# Patient Record
Sex: FEMALE | Race: WHITE | Employment: UNEMPLOYED | URBAN - NONMETROPOLITAN AREA
[De-identification: names, ages, dates, MRNs, and addresses within clinical notes are randomized per-mention and may not be internally consistent; named-entity substitution may affect disease eponyms.]

---

## 2017-02-15 ENCOUNTER — TELEPHONE (OUTPATIENT)
Dept: INTERNAL MEDICINE | Age: 22
End: 2017-02-15

## 2017-02-15 ENCOUNTER — OFFICE VISIT (OUTPATIENT)
Dept: INTERNAL MEDICINE | Age: 22
End: 2017-02-15

## 2017-02-15 VITALS
HEIGHT: 60 IN | SYSTOLIC BLOOD PRESSURE: 106 MMHG | DIASTOLIC BLOOD PRESSURE: 80 MMHG | HEART RATE: 104 BPM | WEIGHT: 89 LBS | BODY MASS INDEX: 17.47 KG/M2

## 2017-02-15 DIAGNOSIS — M54.9 CHRONIC MIDLINE BACK PAIN, UNSPECIFIED BACK LOCATION: ICD-10-CM

## 2017-02-15 DIAGNOSIS — H20.9 IRIDOCYCLITIS OF RIGHT EYE: ICD-10-CM

## 2017-02-15 DIAGNOSIS — Z15.89 HLA B27 (HLA B27 POSITIVE): ICD-10-CM

## 2017-02-15 DIAGNOSIS — G89.29 CHRONIC MIDLINE BACK PAIN, UNSPECIFIED BACK LOCATION: ICD-10-CM

## 2017-02-15 PROCEDURE — 99214 OFFICE O/P EST MOD 30 MIN: CPT | Performed by: INTERNAL MEDICINE

## 2017-03-16 ENCOUNTER — OFFICE VISIT (OUTPATIENT)
Dept: INTERNAL MEDICINE | Age: 22
End: 2017-03-16

## 2017-03-16 VITALS
DIASTOLIC BLOOD PRESSURE: 80 MMHG | HEART RATE: 112 BPM | WEIGHT: 88 LBS | SYSTOLIC BLOOD PRESSURE: 110 MMHG | BODY MASS INDEX: 17.28 KG/M2 | HEIGHT: 60 IN

## 2017-03-16 DIAGNOSIS — E10.9 TYPE 1 DIABETES MELLITUS WITHOUT COMPLICATION (HCC): Primary | ICD-10-CM

## 2017-03-16 PROCEDURE — 99213 OFFICE O/P EST LOW 20 MIN: CPT | Performed by: INTERNAL MEDICINE

## 2017-04-24 ENCOUNTER — OFFICE VISIT (OUTPATIENT)
Dept: INTERNAL MEDICINE | Age: 22
End: 2017-04-24

## 2017-04-24 ENCOUNTER — TELEPHONE (OUTPATIENT)
Dept: INTERNAL MEDICINE | Age: 22
End: 2017-04-24

## 2017-04-24 VITALS
DIASTOLIC BLOOD PRESSURE: 86 MMHG | WEIGHT: 80 LBS | HEIGHT: 60 IN | HEART RATE: 88 BPM | BODY MASS INDEX: 15.71 KG/M2 | SYSTOLIC BLOOD PRESSURE: 120 MMHG

## 2017-04-24 DIAGNOSIS — E10.9 TYPE 1 DIABETES MELLITUS WITHOUT COMPLICATION (HCC): Primary | ICD-10-CM

## 2017-04-24 LAB — HBA1C MFR BLD: 13.5 %

## 2017-04-24 PROCEDURE — 83036 HEMOGLOBIN GLYCOSYLATED A1C: CPT | Performed by: INTERNAL MEDICINE

## 2017-04-24 PROCEDURE — 36416 COLLJ CAPILLARY BLOOD SPEC: CPT | Performed by: INTERNAL MEDICINE

## 2017-04-24 PROCEDURE — 99213 OFFICE O/P EST LOW 20 MIN: CPT | Performed by: INTERNAL MEDICINE

## 2017-04-24 RX ORDER — MELOXICAM 7.5 MG/1
7.5 TABLET ORAL DAILY
Status: ON HOLD | COMMUNITY
Start: 2017-04-14 | End: 2017-08-02

## 2017-04-24 RX ORDER — CYCLOBENZAPRINE HCL 5 MG
0.25 TABLET ORAL PRN
Status: ON HOLD | COMMUNITY
Start: 2017-04-11 | End: 2017-12-22 | Stop reason: HOSPADM

## 2017-04-25 ENCOUNTER — TELEPHONE (OUTPATIENT)
Dept: INTERNAL MEDICINE | Age: 22
End: 2017-04-25

## 2017-04-25 RX ORDER — RAMIPRIL 5 MG/1
5 CAPSULE ORAL DAILY
Qty: 30 CAPSULE | Refills: 3 | Status: ON HOLD | OUTPATIENT
Start: 2017-04-25 | End: 2017-08-02

## 2017-07-21 ENCOUNTER — APPOINTMENT (OUTPATIENT)
Dept: GENERAL RADIOLOGY | Age: 22
End: 2017-07-21
Payer: COMMERCIAL

## 2017-07-21 ENCOUNTER — HOSPITAL ENCOUNTER (EMERGENCY)
Age: 22
Discharge: HOME | End: 2017-07-21
Attending: FAMILY MEDICINE
Payer: COMMERCIAL

## 2017-07-21 VITALS
TEMPERATURE: 98 F | WEIGHT: 83 LBS | SYSTOLIC BLOOD PRESSURE: 115 MMHG | OXYGEN SATURATION: 93 % | RESPIRATION RATE: 16 BRPM | HEART RATE: 77 BPM | DIASTOLIC BLOOD PRESSURE: 74 MMHG | BODY MASS INDEX: 16.21 KG/M2

## 2017-07-21 DIAGNOSIS — D16.02 OSTEOCHONDROMA OF LEFT HUMERUS: Primary | ICD-10-CM

## 2017-07-21 PROCEDURE — 73030 X-RAY EXAM OF SHOULDER: CPT

## 2017-07-21 PROCEDURE — 99283 EMERGENCY DEPT VISIT LOW MDM: CPT

## 2017-07-21 ASSESSMENT — ENCOUNTER SYMPTOMS
COUGH: 0
VOICE CHANGE: 0
SHORTNESS OF BREATH: 0
DIARRHEA: 0
FACIAL SWELLING: 0
VOMITING: 0
ABDOMINAL PAIN: 0
WHEEZING: 0
CONSTIPATION: 0
ABDOMINAL DISTENTION: 0
BACK PAIN: 0
PHOTOPHOBIA: 0
EYE PAIN: 0
RHINORRHEA: 0
NAUSEA: 0
SORE THROAT: 0
CHEST TIGHTNESS: 0
STRIDOR: 0
EYE REDNESS: 0
EYE DISCHARGE: 0
COLOR CHANGE: 0

## 2017-07-21 ASSESSMENT — PAIN DESCRIPTION - DESCRIPTORS: DESCRIPTORS: DULL

## 2017-07-21 ASSESSMENT — PAIN DESCRIPTION - ORIENTATION: ORIENTATION: LEFT

## 2017-07-21 ASSESSMENT — PAIN DESCRIPTION - LOCATION: LOCATION: SHOULDER

## 2017-07-21 ASSESSMENT — PAIN DESCRIPTION - PAIN TYPE: TYPE: ACUTE PAIN

## 2017-07-21 ASSESSMENT — PAIN SCALES - GENERAL: PAINLEVEL_OUTOF10: 2

## 2017-08-01 ENCOUNTER — APPOINTMENT (OUTPATIENT)
Dept: INTERVENTIONAL RADIOLOGY/VASCULAR | Age: 22
DRG: 639 | End: 2017-08-01
Payer: COMMERCIAL

## 2017-08-01 ENCOUNTER — HOSPITAL ENCOUNTER (INPATIENT)
Age: 22
LOS: 4 days | Discharge: HOME | DRG: 639 | End: 2017-08-05
Attending: FAMILY MEDICINE | Admitting: INTERNAL MEDICINE
Payer: COMMERCIAL

## 2017-08-01 DIAGNOSIS — E10.10 DIABETIC KETOACIDOSIS WITHOUT COMA ASSOCIATED WITH TYPE 1 DIABETES MELLITUS (HCC): Primary | ICD-10-CM

## 2017-08-01 DIAGNOSIS — Z87.898 HISTORY OF DIARRHEA: ICD-10-CM

## 2017-08-01 DIAGNOSIS — R60.0 PEDAL EDEMA: ICD-10-CM

## 2017-08-01 DIAGNOSIS — E10.9 TYPE 1 DIABETES MELLITUS WITHOUT COMPLICATION (HCC): ICD-10-CM

## 2017-08-01 DIAGNOSIS — Z91.14 H/O MEDICATION NONCOMPLIANCE: ICD-10-CM

## 2017-08-01 PROBLEM — E11.10 DKA (DIABETIC KETOACIDOSES): Status: ACTIVE | Noted: 2017-08-01

## 2017-08-01 PROBLEM — R19.7 DIARRHEA: Status: ACTIVE | Noted: 2017-08-01

## 2017-08-01 LAB
ALBUMIN SERPL-MCNC: 4.2 G/DL (ref 3.5–5.1)
ALP BLD-CCNC: 173 U/L (ref 38–126)
ALT SERPL-CCNC: 24 U/L (ref 11–66)
ANION GAP SERPL CALCULATED.3IONS-SCNC: 15 MEQ/L (ref 8–16)
ANION GAP SERPL CALCULATED.3IONS-SCNC: 33 MEQ/L (ref 8–16)
AST SERPL-CCNC: 24 U/L (ref 5–40)
AVERAGE GLUCOSE: 525 MG/DL (ref 70–126)
BACTERIA: ABNORMAL /HPF
BASOPHILS # BLD: 0.1 %
BASOPHILS ABSOLUTE: 0 THOU/MM3 (ref 0–0.1)
BETA-HYDROXYBUTYRATE: 13.79 MG/DL (ref 0.2–2.81)
BETA-HYDROXYBUTYRATE: 70.87 MG/DL (ref 0.2–2.81)
BILIRUB SERPL-MCNC: 0.3 MG/DL (ref 0.3–1.2)
BILIRUBIN DIRECT: < 0.2 MG/DL (ref 0–0.3)
BILIRUBIN URINE: NEGATIVE
BLOOD, URINE: NEGATIVE
BUN BLDV-MCNC: 16 MG/DL (ref 7–22)
BUN BLDV-MCNC: 21 MG/DL (ref 7–22)
CALCIUM SERPL-MCNC: 8.1 MG/DL (ref 8.5–10.5)
CALCIUM SERPL-MCNC: 9.4 MG/DL (ref 8.5–10.5)
CASTS 2: ABNORMAL /LPF
CASTS UA: ABNORMAL /LPF
CHARACTER, URINE: CLEAR
CHLORIDE BLD-SCNC: 83 MEQ/L (ref 98–111)
CHLORIDE BLD-SCNC: 97 MEQ/L (ref 98–111)
CHP ED QC CHECK: NORMAL
CLOSTRIDIUM DIFFICILE, PCR: NEGATIVE
CO2: 14 MEQ/L (ref 23–33)
CO2: 21 MEQ/L (ref 23–33)
COLOR: YELLOW
CREAT SERPL-MCNC: 0.5 MG/DL (ref 0.4–1.2)
CREAT SERPL-MCNC: 0.6 MG/DL (ref 0.4–1.2)
CRYSTALS, UA: ABNORMAL
D-DIMER QUANTITATIVE: < 215 NG/ML FEU (ref 0–500)
EOSINOPHIL # BLD: 0.4 %
EOSINOPHILS ABSOLUTE: 0 THOU/MM3 (ref 0–0.4)
EPITHELIAL CELLS, UA: ABNORMAL /HPF
GFR SERPL CREATININE-BSD FRML MDRD: > 90 ML/MIN/1.73M2
GFR SERPL CREATININE-BSD FRML MDRD: > 90 ML/MIN/1.73M2
GLUCOSE BLD-MCNC: 192 MG/DL (ref 70–108)
GLUCOSE BLD-MCNC: 271 MG/DL (ref 70–108)
GLUCOSE BLD-MCNC: 308 MG/DL (ref 70–108)
GLUCOSE BLD-MCNC: 371 MG/DL (ref 70–108)
GLUCOSE BLD-MCNC: 381 MG/DL (ref 70–108)
GLUCOSE BLD-MCNC: 412 MG/DL (ref 70–108)
GLUCOSE BLD-MCNC: 424 MG/DL (ref 70–108)
GLUCOSE BLD-MCNC: 445 MG/DL (ref 70–108)
GLUCOSE BLD-MCNC: 481 MG/DL
GLUCOSE BLD-MCNC: 481 MG/DL (ref 70–108)
GLUCOSE BLD-MCNC: 536 MG/DL (ref 70–108)
GLUCOSE URINE: >= 1000 MG/DL
HBA1C MFR BLD: 19.5 % (ref 4.4–6.4)
HBA1C MFR BLD: NORMAL % (ref 4.4–6.4)
HCT VFR BLD CALC: 36.6 % (ref 37–47)
HEMOGLOBIN: 12.9 GM/DL (ref 12–16)
KETONES, URINE: >= 160
LACTIC ACID: 0.7 MMOL/L (ref 0.5–2.2)
LEUKOCYTE ESTERASE, URINE: NEGATIVE
LIPASE: 39.8 U/L (ref 5.6–51.3)
LYMPHOCYTES # BLD: 21.8 %
LYMPHOCYTES ABSOLUTE: 1.5 THOU/MM3 (ref 1–4.8)
MAGNESIUM: 1.9 MG/DL (ref 1.6–2.4)
MAGNESIUM: 2.3 MG/DL (ref 1.6–2.4)
MCH RBC QN AUTO: 33.8 PG (ref 27–31)
MCHC RBC AUTO-ENTMCNC: 35.1 GM/DL (ref 33–37)
MCV RBC AUTO: 96.5 FL (ref 81–99)
MISCELLANEOUS 2: ABNORMAL
MONOCYTES # BLD: 9.4 %
MONOCYTES ABSOLUTE: 0.7 THOU/MM3 (ref 0.4–1.3)
NITRITE, URINE: NEGATIVE
NUCLEATED RED BLOOD CELLS: 0 /100 WBC
OSMOLALITY CALCULATION: 288.1 MOSMOL/KG (ref 275–300)
PDW BLD-RTO: 14.1 % (ref 11.5–14.5)
PH UA: 5
PH VENOUS: 7.33 (ref 7.33–7.43)
PHOSPHORUS: 3.5 MG/DL (ref 2.4–4.7)
PHOSPHORUS: 4.5 MG/DL (ref 2.4–4.7)
PLATELET # BLD: 346 THOU/MM3 (ref 130–400)
PMV BLD AUTO: 7.3 MCM (ref 7.4–10.4)
POTASSIUM SERPL-SCNC: 3.8 MEQ/L (ref 3.5–5.2)
POTASSIUM SERPL-SCNC: 4.8 MEQ/L (ref 3.5–5.2)
PREALBUMIN: 16.8 MG/DL (ref 20–40)
PREGNANCY, URINE: NEGATIVE
PRO-BNP: 14.9 PG/ML (ref 0–450)
PROTEIN UA: NEGATIVE
RBC # BLD: 3.8 MILL/MM3 (ref 4.2–5.4)
RBC # BLD: NORMAL 10*6/UL
RBC URINE: ABNORMAL /HPF
RENAL EPITHELIAL, UA: ABNORMAL
SEG NEUTROPHILS: 68.3 %
SEGMENTED NEUTROPHILS ABSOLUTE COUNT: 4.8 THOU/MM3 (ref 1.8–7.7)
SODIUM BLD-SCNC: 130 MEQ/L (ref 135–145)
SODIUM BLD-SCNC: 133 MEQ/L (ref 135–145)
SPECIFIC GRAVITY, URINE: > 1.03 (ref 1–1.03)
TOTAL CK: 35 U/L (ref 30–135)
TOTAL PROTEIN: 6.8 G/DL (ref 6.1–8)
TSH SERPL DL<=0.05 MIU/L-ACNC: 3.28 UIU/ML (ref 0.4–4.2)
UROBILINOGEN, URINE: 0.2 EU/DL
WBC # BLD: 7 THOU/MM3 (ref 4.8–10.8)
WBC UA: ABNORMAL /HPF
YEAST: ABNORMAL

## 2017-08-01 PROCEDURE — 81001 URINALYSIS AUTO W/SCOPE: CPT

## 2017-08-01 PROCEDURE — 2580000003 HC RX 258: Performed by: FAMILY MEDICINE

## 2017-08-01 PROCEDURE — 2580000003 HC RX 258: Performed by: INTERNAL MEDICINE

## 2017-08-01 PROCEDURE — 83036 HEMOGLOBIN GLYCOSYLATED A1C: CPT

## 2017-08-01 PROCEDURE — 1200000003 HC TELEMETRY R&B

## 2017-08-01 PROCEDURE — 84134 ASSAY OF PREALBUMIN: CPT

## 2017-08-01 PROCEDURE — 85025 COMPLETE CBC W/AUTO DIFF WBC: CPT

## 2017-08-01 PROCEDURE — 82800 BLOOD PH: CPT

## 2017-08-01 PROCEDURE — 80048 BASIC METABOLIC PNL TOTAL CA: CPT

## 2017-08-01 PROCEDURE — 82550 ASSAY OF CK (CPK): CPT

## 2017-08-01 PROCEDURE — 6370000000 HC RX 637 (ALT 250 FOR IP): Performed by: FAMILY MEDICINE

## 2017-08-01 PROCEDURE — 87040 BLOOD CULTURE FOR BACTERIA: CPT

## 2017-08-01 PROCEDURE — 87427 SHIGA-LIKE TOXIN AG IA: CPT

## 2017-08-01 PROCEDURE — 84100 ASSAY OF PHOSPHORUS: CPT

## 2017-08-01 PROCEDURE — 80053 COMPREHEN METABOLIC PANEL: CPT

## 2017-08-01 PROCEDURE — 83880 ASSAY OF NATRIURETIC PEPTIDE: CPT

## 2017-08-01 PROCEDURE — 93970 EXTREMITY STUDY: CPT

## 2017-08-01 PROCEDURE — 96361 HYDRATE IV INFUSION ADD-ON: CPT

## 2017-08-01 PROCEDURE — 87329 GIARDIA AG IA: CPT

## 2017-08-01 PROCEDURE — 99284 EMERGENCY DEPT VISIT MOD MDM: CPT

## 2017-08-01 PROCEDURE — 2500000003 HC RX 250 WO HCPCS: Performed by: INTERNAL MEDICINE

## 2017-08-01 PROCEDURE — 83735 ASSAY OF MAGNESIUM: CPT

## 2017-08-01 PROCEDURE — 87086 URINE CULTURE/COLONY COUNT: CPT

## 2017-08-01 PROCEDURE — 85379 FIBRIN DEGRADATION QUANT: CPT

## 2017-08-01 PROCEDURE — 83605 ASSAY OF LACTIC ACID: CPT

## 2017-08-01 PROCEDURE — 83690 ASSAY OF LIPASE: CPT

## 2017-08-01 PROCEDURE — 36415 COLL VENOUS BLD VENIPUNCTURE: CPT

## 2017-08-01 PROCEDURE — 82948 REAGENT STRIP/BLOOD GLUCOSE: CPT

## 2017-08-01 PROCEDURE — 96365 THER/PROPH/DIAG IV INF INIT: CPT

## 2017-08-01 PROCEDURE — 81025 URINE PREGNANCY TEST: CPT

## 2017-08-01 PROCEDURE — 96366 THER/PROPH/DIAG IV INF ADDON: CPT

## 2017-08-01 PROCEDURE — 87045 FECES CULTURE AEROBIC BACT: CPT

## 2017-08-01 PROCEDURE — 82010 KETONE BODYS QUAN: CPT

## 2017-08-01 PROCEDURE — 87493 C DIFF AMPLIFIED PROBE: CPT

## 2017-08-01 PROCEDURE — 82248 BILIRUBIN DIRECT: CPT

## 2017-08-01 PROCEDURE — 82043 UR ALBUMIN QUANTITATIVE: CPT

## 2017-08-01 PROCEDURE — 84443 ASSAY THYROID STIM HORMONE: CPT

## 2017-08-01 RX ORDER — 0.9 % SODIUM CHLORIDE 0.9 %
1000 INTRAVENOUS SOLUTION INTRAVENOUS ONCE
Status: COMPLETED | OUTPATIENT
Start: 2017-08-01 | End: 2017-08-01

## 2017-08-01 RX ORDER — DEXTROSE AND SODIUM CHLORIDE 5; .45 G/100ML; G/100ML
INJECTION, SOLUTION INTRAVENOUS CONTINUOUS PRN
Status: DISCONTINUED | OUTPATIENT
Start: 2017-08-01 | End: 2017-08-02

## 2017-08-01 RX ORDER — POTASSIUM CHLORIDE 7.45 MG/ML
10 INJECTION INTRAVENOUS PRN
Status: DISCONTINUED | OUTPATIENT
Start: 2017-08-01 | End: 2017-08-05 | Stop reason: HOSPADM

## 2017-08-01 RX ORDER — DEXTROSE MONOHYDRATE 25 G/50ML
12.5 INJECTION, SOLUTION INTRAVENOUS PRN
Status: DISCONTINUED | OUTPATIENT
Start: 2017-08-01 | End: 2017-08-02 | Stop reason: SDUPTHER

## 2017-08-01 RX ORDER — SODIUM CHLORIDE 450 MG/100ML
INJECTION, SOLUTION INTRAVENOUS CONTINUOUS
Status: DISCONTINUED | OUTPATIENT
Start: 2017-08-01 | End: 2017-08-01 | Stop reason: ALTCHOICE

## 2017-08-01 RX ORDER — RAMIPRIL 5 MG/1
5 CAPSULE ORAL DAILY
Status: CANCELLED | OUTPATIENT
Start: 2017-08-01

## 2017-08-01 RX ADMIN — SODIUM BICARBONATE: 84 INJECTION, SOLUTION INTRAVENOUS at 21:39

## 2017-08-01 RX ADMIN — SODIUM CHLORIDE 1000 ML: 9 INJECTION, SOLUTION INTRAVENOUS at 15:01

## 2017-08-01 RX ADMIN — SODIUM CHLORIDE 1000 ML: 9 INJECTION, SOLUTION INTRAVENOUS at 16:34

## 2017-08-01 RX ADMIN — SODIUM CHLORIDE 7.9 UNITS/KG/HR: 9 INJECTION, SOLUTION INTRAVENOUS at 23:40

## 2017-08-01 RX ADMIN — SODIUM CHLORIDE 7 UNITS/HR: 9 INJECTION, SOLUTION INTRAVENOUS at 16:31

## 2017-08-01 ASSESSMENT — PAIN SCALES - GENERAL: PAINLEVEL_OUTOF10: 0

## 2017-08-02 LAB
ANION GAP SERPL CALCULATED.3IONS-SCNC: 10 MEQ/L (ref 8–16)
ANION GAP SERPL CALCULATED.3IONS-SCNC: 11 MEQ/L (ref 8–16)
ANION GAP SERPL CALCULATED.3IONS-SCNC: 12 MEQ/L (ref 8–16)
ANION GAP SERPL CALCULATED.3IONS-SCNC: 14 MEQ/L (ref 8–16)
BASOPHILS # BLD: 0.3 %
BASOPHILS ABSOLUTE: 0 THOU/MM3 (ref 0–0.1)
BETA-HYDROXYBUTYRATE: 0.76 MG/DL (ref 0.2–2.81)
BETA-HYDROXYBUTYRATE: 8.62 MG/DL (ref 0.2–2.81)
BUN BLDV-MCNC: 10 MG/DL (ref 7–22)
BUN BLDV-MCNC: 13 MG/DL (ref 7–22)
BUN BLDV-MCNC: 15 MG/DL (ref 7–22)
BUN BLDV-MCNC: 15 MG/DL (ref 7–22)
BUN BLDV-MCNC: 16 MG/DL (ref 7–22)
BUN BLDV-MCNC: 9 MG/DL (ref 7–22)
CALCIUM SERPL-MCNC: 8.1 MG/DL (ref 8.5–10.5)
CALCIUM SERPL-MCNC: 8.2 MG/DL (ref 8.5–10.5)
CALCIUM SERPL-MCNC: 8.3 MG/DL (ref 8.5–10.5)
CALCIUM SERPL-MCNC: 8.5 MG/DL (ref 8.5–10.5)
CHLORIDE BLD-SCNC: 100 MEQ/L (ref 98–111)
CHLORIDE BLD-SCNC: 95 MEQ/L (ref 98–111)
CHLORIDE BLD-SCNC: 96 MEQ/L (ref 98–111)
CHLORIDE BLD-SCNC: 96 MEQ/L (ref 98–111)
CHLORIDE BLD-SCNC: 97 MEQ/L (ref 98–111)
CHLORIDE BLD-SCNC: 97 MEQ/L (ref 98–111)
CLOSTRIDIUM DIFFICILE, PCR: NEGATIVE
CO2: 24 MEQ/L (ref 23–33)
CO2: 27 MEQ/L (ref 23–33)
CO2: 30 MEQ/L (ref 23–33)
CO2: 30 MEQ/L (ref 23–33)
CREAT SERPL-MCNC: 0.3 MG/DL (ref 0.4–1.2)
CREAT SERPL-MCNC: 0.4 MG/DL (ref 0.4–1.2)
CREATININE, URINE: 20.9 MG/DL
EOSINOPHIL # BLD: 0.6 %
EOSINOPHILS ABSOLUTE: 0 THOU/MM3 (ref 0–0.4)
FECAL LEUKOCYTES: NORMAL
GFR SERPL CREATININE-BSD FRML MDRD: > 90 ML/MIN/1.73M2
GIARDIA ANTIGEN STOOL: NEGATIVE
GLUCOSE BLD-MCNC: 117 MG/DL (ref 70–108)
GLUCOSE BLD-MCNC: 118 MG/DL (ref 70–108)
GLUCOSE BLD-MCNC: 119 MG/DL (ref 70–108)
GLUCOSE BLD-MCNC: 127 MG/DL (ref 70–108)
GLUCOSE BLD-MCNC: 136 MG/DL (ref 70–108)
GLUCOSE BLD-MCNC: 144 MG/DL (ref 70–108)
GLUCOSE BLD-MCNC: 145 MG/DL (ref 70–108)
GLUCOSE BLD-MCNC: 146 MG/DL (ref 70–108)
GLUCOSE BLD-MCNC: 147 MG/DL (ref 70–108)
GLUCOSE BLD-MCNC: 147 MG/DL (ref 70–108)
GLUCOSE BLD-MCNC: 151 MG/DL (ref 70–108)
GLUCOSE BLD-MCNC: 151 MG/DL (ref 70–108)
GLUCOSE BLD-MCNC: 153 MG/DL (ref 70–108)
GLUCOSE BLD-MCNC: 154 MG/DL (ref 70–108)
GLUCOSE BLD-MCNC: 157 MG/DL (ref 70–108)
GLUCOSE BLD-MCNC: 184 MG/DL (ref 70–108)
GLUCOSE BLD-MCNC: 185 MG/DL (ref 70–108)
GLUCOSE BLD-MCNC: 196 MG/DL (ref 70–108)
GLUCOSE BLD-MCNC: 199 MG/DL (ref 70–108)
GLUCOSE BLD-MCNC: 231 MG/DL (ref 70–108)
GLUCOSE BLD-MCNC: 238 MG/DL (ref 70–108)
GLUCOSE BLD-MCNC: 275 MG/DL (ref 70–108)
GLUCOSE BLD-MCNC: 290 MG/DL (ref 70–108)
GLUCOSE BLD-MCNC: 327 MG/DL (ref 70–108)
GLUCOSE BLD-MCNC: 336 MG/DL (ref 70–108)
GLUCOSE BLD-MCNC: 343 MG/DL (ref 70–108)
GLUCOSE BLD-MCNC: 343 MG/DL (ref 70–108)
HCT VFR BLD CALC: 28.6 % (ref 37–47)
HEMOGLOBIN: 10.1 GM/DL (ref 12–16)
LYMPHOCYTES # BLD: 26.9 %
LYMPHOCYTES ABSOLUTE: 1.4 THOU/MM3 (ref 1–4.8)
MAGNESIUM: 1.7 MG/DL (ref 1.6–2.4)
MAGNESIUM: 1.8 MG/DL (ref 1.6–2.4)
MCH RBC QN AUTO: 33.9 PG (ref 27–31)
MCHC RBC AUTO-ENTMCNC: 35.3 GM/DL (ref 33–37)
MCV RBC AUTO: 95.9 FL (ref 81–99)
MICROALBUMIN UR-MCNC: 1.36 MG/DL
MICROALBUMIN/CREAT UR-RTO: 65 MG/G (ref 0–30)
MONOCYTES # BLD: 11.2 %
MONOCYTES ABSOLUTE: 0.6 THOU/MM3 (ref 0.4–1.3)
NUCLEATED RED BLOOD CELLS: 0 /100 WBC
ORGANISM: ABNORMAL
PDW BLD-RTO: 14 % (ref 11.5–14.5)
PH VENOUS: 7.41 (ref 7.33–7.43)
PH VENOUS: 7.43 (ref 7.33–7.43)
PH VENOUS: 7.45 (ref 7.33–7.43)
PH VENOUS: 7.45 (ref 7.33–7.43)
PH VENOUS: 7.48 (ref 7.33–7.43)
PHOSPHORUS: 3 MG/DL (ref 2.4–4.7)
PHOSPHORUS: 3.1 MG/DL (ref 2.4–4.7)
PHOSPHORUS: 3.2 MG/DL (ref 2.4–4.7)
PHOSPHORUS: 3.2 MG/DL (ref 2.4–4.7)
PLATELET # BLD: 248 THOU/MM3 (ref 130–400)
PMV BLD AUTO: 6.6 MCM (ref 7.4–10.4)
POTASSIUM SERPL-SCNC: 2.6 MEQ/L (ref 3.5–5.2)
POTASSIUM SERPL-SCNC: 2.8 MEQ/L (ref 3.5–5.2)
POTASSIUM SERPL-SCNC: 3 MEQ/L (ref 3.5–5.2)
POTASSIUM SERPL-SCNC: 3.1 MEQ/L (ref 3.5–5.2)
POTASSIUM SERPL-SCNC: 3.2 MEQ/L (ref 3.5–5.2)
POTASSIUM SERPL-SCNC: 3.5 MEQ/L (ref 3.5–5.2)
RBC # BLD: 2.98 MILL/MM3 (ref 4.2–5.4)
RBC # BLD: NORMAL 10*6/UL
ROTAVIRUS ANTIGEN: NEGATIVE
SEG NEUTROPHILS: 61 %
SEGMENTED NEUTROPHILS ABSOLUTE COUNT: 3.2 THOU/MM3 (ref 1.8–7.7)
SODIUM BLD-SCNC: 135 MEQ/L (ref 135–145)
SODIUM BLD-SCNC: 135 MEQ/L (ref 135–145)
SODIUM BLD-SCNC: 136 MEQ/L (ref 135–145)
SODIUM BLD-SCNC: 136 MEQ/L (ref 135–145)
SODIUM BLD-SCNC: 137 MEQ/L (ref 135–145)
SODIUM BLD-SCNC: 138 MEQ/L (ref 135–145)
URINE CULTURE REFLEX: ABNORMAL
WBC # BLD: 5.2 THOU/MM3 (ref 4.8–10.8)

## 2017-08-02 PROCEDURE — 2580000003 HC RX 258: Performed by: INTERNAL MEDICINE

## 2017-08-02 PROCEDURE — 6360000002 HC RX W HCPCS: Performed by: INTERNAL MEDICINE

## 2017-08-02 PROCEDURE — 80048 BASIC METABOLIC PNL TOTAL CA: CPT

## 2017-08-02 PROCEDURE — 36415 COLL VENOUS BLD VENIPUNCTURE: CPT

## 2017-08-02 PROCEDURE — 87427 SHIGA-LIKE TOXIN AG IA: CPT

## 2017-08-02 PROCEDURE — 2500000003 HC RX 250 WO HCPCS: Performed by: INTERNAL MEDICINE

## 2017-08-02 PROCEDURE — 1200000003 HC TELEMETRY R&B

## 2017-08-02 PROCEDURE — 89055 LEUKOCYTE ASSESSMENT FECAL: CPT

## 2017-08-02 PROCEDURE — 6370000000 HC RX 637 (ALT 250 FOR IP): Performed by: INTERNAL MEDICINE

## 2017-08-02 PROCEDURE — 2580000003 HC RX 258: Performed by: FAMILY MEDICINE

## 2017-08-02 PROCEDURE — 82800 BLOOD PH: CPT

## 2017-08-02 PROCEDURE — 82010 KETONE BODYS QUAN: CPT

## 2017-08-02 PROCEDURE — 87045 FECES CULTURE AEROBIC BACT: CPT

## 2017-08-02 PROCEDURE — 87425 ROTAVIRUS AG IA: CPT

## 2017-08-02 PROCEDURE — 84100 ASSAY OF PHOSPHORUS: CPT

## 2017-08-02 PROCEDURE — 6370000000 HC RX 637 (ALT 250 FOR IP): Performed by: FAMILY MEDICINE

## 2017-08-02 PROCEDURE — 82948 REAGENT STRIP/BLOOD GLUCOSE: CPT

## 2017-08-02 PROCEDURE — 83735 ASSAY OF MAGNESIUM: CPT

## 2017-08-02 PROCEDURE — 85025 COMPLETE CBC W/AUTO DIFF WBC: CPT

## 2017-08-02 PROCEDURE — 87493 C DIFF AMPLIFIED PROBE: CPT

## 2017-08-02 RX ORDER — POTASSIUM CHLORIDE 7.45 MG/ML
10 INJECTION INTRAVENOUS
Status: COMPLETED | OUTPATIENT
Start: 2017-08-02 | End: 2017-08-02

## 2017-08-02 RX ORDER — DEXTROSE AND SODIUM CHLORIDE 5; .45 G/100ML; G/100ML
INJECTION, SOLUTION INTRAVENOUS CONTINUOUS
Status: DISCONTINUED | OUTPATIENT
Start: 2017-08-02 | End: 2017-08-03

## 2017-08-02 RX ORDER — DEXTROSE MONOHYDRATE 25 G/50ML
12.5 INJECTION, SOLUTION INTRAVENOUS PRN
Status: DISCONTINUED | OUTPATIENT
Start: 2017-08-02 | End: 2017-08-05 | Stop reason: HOSPADM

## 2017-08-02 RX ORDER — NICOTINE POLACRILEX 4 MG
15 LOZENGE BUCCAL PRN
Status: DISCONTINUED | OUTPATIENT
Start: 2017-08-02 | End: 2017-08-05 | Stop reason: HOSPADM

## 2017-08-02 RX ORDER — ONDANSETRON 2 MG/ML
4 INJECTION INTRAMUSCULAR; INTRAVENOUS EVERY 6 HOURS PRN
Status: DISCONTINUED | OUTPATIENT
Start: 2017-08-02 | End: 2017-08-05 | Stop reason: HOSPADM

## 2017-08-02 RX ORDER — BUTALBITAL, ACETAMINOPHEN AND CAFFEINE 50; 325; 40 MG/1; MG/1; MG/1
1 TABLET ORAL EVERY 6 HOURS PRN
Status: DISCONTINUED | OUTPATIENT
Start: 2017-08-02 | End: 2017-08-05 | Stop reason: HOSPADM

## 2017-08-02 RX ORDER — M-VIT,TX,IRON,MINS/CALC/FOLIC 27MG-0.4MG
1 TABLET ORAL DAILY
COMMUNITY

## 2017-08-02 RX ORDER — LOPERAMIDE HYDROCHLORIDE 2 MG/1
2 CAPSULE ORAL 4 TIMES DAILY PRN
Status: DISCONTINUED | OUTPATIENT
Start: 2017-08-02 | End: 2017-08-05 | Stop reason: HOSPADM

## 2017-08-02 RX ADMIN — SODIUM CHLORIDE 0.23 UNITS/KG/HR: 9 INJECTION, SOLUTION INTRAVENOUS at 18:23

## 2017-08-02 RX ADMIN — BUTALBITAL, ACETAMINOPHEN, AND CAFFEINE 1 TABLET: 50; 325; 40 TABLET ORAL at 16:49

## 2017-08-02 RX ADMIN — DEXTROSE AND SODIUM CHLORIDE: 5; 450 INJECTION, SOLUTION INTRAVENOUS at 13:07

## 2017-08-02 RX ADMIN — DEXTROSE AND SODIUM CHLORIDE: 5; 450 INJECTION, SOLUTION INTRAVENOUS at 19:52

## 2017-08-02 RX ADMIN — BUTALBITAL, ACETAMINOPHEN, AND CAFFEINE 1 TABLET: 50; 325; 40 TABLET ORAL at 02:40

## 2017-08-02 RX ADMIN — BUTALBITAL, ACETAMINOPHEN, AND CAFFEINE 1 TABLET: 50; 325; 40 TABLET ORAL at 08:44

## 2017-08-02 RX ADMIN — ENOXAPARIN SODIUM 40 MG: 40 INJECTION SUBCUTANEOUS at 09:56

## 2017-08-02 RX ADMIN — BISMUTH SUBSALICYLATE 30 ML: 262 LIQUID ORAL at 13:11

## 2017-08-02 ASSESSMENT — PAIN DESCRIPTION - PAIN TYPE: TYPE: ACUTE PAIN

## 2017-08-02 ASSESSMENT — PAIN SCALES - GENERAL
PAINLEVEL_OUTOF10: 8
PAINLEVEL_OUTOF10: 0
PAINLEVEL_OUTOF10: 6
PAINLEVEL_OUTOF10: 0
PAINLEVEL_OUTOF10: 7

## 2017-08-02 ASSESSMENT — PAIN DESCRIPTION - LOCATION: LOCATION: HEAD

## 2017-08-02 ASSESSMENT — PAIN DESCRIPTION - FREQUENCY: FREQUENCY: CONTINUOUS

## 2017-08-02 ASSESSMENT — PAIN DESCRIPTION - DESCRIPTORS: DESCRIPTORS: POUNDING

## 2017-08-02 ASSESSMENT — PAIN DESCRIPTION - ONSET: ONSET: GRADUAL

## 2017-08-03 LAB
ANION GAP SERPL CALCULATED.3IONS-SCNC: 12 MEQ/L (ref 8–16)
ANION GAP SERPL CALCULATED.3IONS-SCNC: 9 MEQ/L (ref 8–16)
BUN BLDV-MCNC: 8 MG/DL (ref 7–22)
BUN BLDV-MCNC: 9 MG/DL (ref 7–22)
CALCIUM SERPL-MCNC: 8.4 MG/DL (ref 8.5–10.5)
CALCIUM SERPL-MCNC: 8.5 MG/DL (ref 8.5–10.5)
CHLORIDE BLD-SCNC: 101 MEQ/L (ref 98–111)
CHLORIDE BLD-SCNC: 103 MEQ/L (ref 98–111)
CO2: 25 MEQ/L (ref 23–33)
CO2: 28 MEQ/L (ref 23–33)
CREAT SERPL-MCNC: 0.3 MG/DL (ref 0.4–1.2)
CREAT SERPL-MCNC: 0.3 MG/DL (ref 0.4–1.2)
CULTURE, STOOL: NORMAL
GFR SERPL CREATININE-BSD FRML MDRD: > 90 ML/MIN/1.73M2
GFR SERPL CREATININE-BSD FRML MDRD: > 90 ML/MIN/1.73M2
GLUCOSE BLD-MCNC: 107 MG/DL (ref 70–108)
GLUCOSE BLD-MCNC: 109 MG/DL (ref 70–108)
GLUCOSE BLD-MCNC: 112 MG/DL (ref 70–108)
GLUCOSE BLD-MCNC: 125 MG/DL (ref 70–108)
GLUCOSE BLD-MCNC: 218 MG/DL (ref 70–108)
GLUCOSE BLD-MCNC: 227 MG/DL (ref 70–108)
GLUCOSE BLD-MCNC: 75 MG/DL (ref 70–108)
GLUCOSE BLD-MCNC: 79 MG/DL (ref 70–108)
GLUCOSE BLD-MCNC: 84 MG/DL (ref 70–108)
GLUCOSE BLD-MCNC: 89 MG/DL (ref 70–108)
GLUCOSE BLD-MCNC: 93 MG/DL (ref 70–108)
GLUCOSE BLD-MCNC: 97 MG/DL (ref 70–108)
MAGNESIUM: 1.7 MG/DL (ref 1.6–2.4)
MAGNESIUM: 1.7 MG/DL (ref 1.6–2.4)
PH VENOUS: 7.49 (ref 7.33–7.43)
PH VENOUS: 7.51 (ref 7.33–7.43)
PHOSPHORUS: 3.1 MG/DL (ref 2.4–4.7)
PHOSPHORUS: 3.6 MG/DL (ref 2.4–4.7)
POTASSIUM SERPL-SCNC: 3.4 MEQ/L (ref 3.5–5.2)
POTASSIUM SERPL-SCNC: 3.7 MEQ/L (ref 3.5–5.2)
SODIUM BLD-SCNC: 138 MEQ/L (ref 135–145)
SODIUM BLD-SCNC: 140 MEQ/L (ref 135–145)

## 2017-08-03 PROCEDURE — 6370000000 HC RX 637 (ALT 250 FOR IP): Performed by: INTERNAL MEDICINE

## 2017-08-03 PROCEDURE — 97161 PT EVAL LOW COMPLEX 20 MIN: CPT

## 2017-08-03 PROCEDURE — 6360000002 HC RX W HCPCS: Performed by: INTERNAL MEDICINE

## 2017-08-03 PROCEDURE — 83735 ASSAY OF MAGNESIUM: CPT

## 2017-08-03 PROCEDURE — 80048 BASIC METABOLIC PNL TOTAL CA: CPT

## 2017-08-03 PROCEDURE — 2580000003 HC RX 258: Performed by: INTERNAL MEDICINE

## 2017-08-03 PROCEDURE — 82800 BLOOD PH: CPT

## 2017-08-03 PROCEDURE — 84100 ASSAY OF PHOSPHORUS: CPT

## 2017-08-03 PROCEDURE — 36415 COLL VENOUS BLD VENIPUNCTURE: CPT

## 2017-08-03 PROCEDURE — 82948 REAGENT STRIP/BLOOD GLUCOSE: CPT

## 2017-08-03 PROCEDURE — 1200000003 HC TELEMETRY R&B

## 2017-08-03 RX ORDER — INSULIN GLARGINE 100 [IU]/ML
35 INJECTION, SOLUTION SUBCUTANEOUS NIGHTLY
Status: DISCONTINUED | OUTPATIENT
Start: 2017-08-03 | End: 2017-08-04

## 2017-08-03 RX ORDER — POTASSIUM CHLORIDE 7.45 MG/ML
10 INJECTION INTRAVENOUS
Status: DISPENSED | OUTPATIENT
Start: 2017-08-03 | End: 2017-08-03

## 2017-08-03 RX ORDER — POTASSIUM CHLORIDE AND SODIUM CHLORIDE 450; 150 MG/100ML; MG/100ML
INJECTION, SOLUTION INTRAVENOUS CONTINUOUS
Status: DISCONTINUED | OUTPATIENT
Start: 2017-08-03 | End: 2017-08-04

## 2017-08-03 RX ORDER — INSULIN GLARGINE 100 [IU]/ML
30 INJECTION, SOLUTION SUBCUTANEOUS EVERY MORNING
Status: DISCONTINUED | OUTPATIENT
Start: 2017-08-03 | End: 2017-08-04

## 2017-08-03 RX ADMIN — POTASSIUM CHLORIDE 10 MEQ: 7.46 INJECTION, SOLUTION INTRAVENOUS at 06:59

## 2017-08-03 RX ADMIN — POTASSIUM CHLORIDE AND SODIUM CHLORIDE: 450; 150 INJECTION, SOLUTION INTRAVENOUS at 19:44

## 2017-08-03 RX ADMIN — DEXTROSE AND SODIUM CHLORIDE: 5; 450 INJECTION, SOLUTION INTRAVENOUS at 02:02

## 2017-08-03 RX ADMIN — INSULIN GLARGINE 35 UNITS: 100 INJECTION, SOLUTION SUBCUTANEOUS at 20:29

## 2017-08-03 RX ADMIN — POTASSIUM CHLORIDE 10 MEQ: 7.46 INJECTION, SOLUTION INTRAVENOUS at 05:18

## 2017-08-03 RX ADMIN — INSULIN GLARGINE 30 UNITS: 100 INJECTION, SOLUTION SUBCUTANEOUS at 09:11

## 2017-08-03 RX ADMIN — INSULIN LISPRO 2 UNITS: 100 INJECTION, SOLUTION INTRAVENOUS; SUBCUTANEOUS at 20:28

## 2017-08-03 RX ADMIN — POTASSIUM CHLORIDE AND SODIUM CHLORIDE: 450; 150 INJECTION, SOLUTION INTRAVENOUS at 09:11

## 2017-08-03 RX ADMIN — Medication 4 UNITS: at 13:15

## 2017-08-04 LAB
ANION GAP SERPL CALCULATED.3IONS-SCNC: 12 MEQ/L (ref 8–16)
BUN BLDV-MCNC: 15 MG/DL (ref 7–22)
CALCIUM SERPL-MCNC: 8.8 MG/DL (ref 8.5–10.5)
CHLORIDE BLD-SCNC: 104 MEQ/L (ref 98–111)
CO2: 25 MEQ/L (ref 23–33)
CREAT SERPL-MCNC: 0.4 MG/DL (ref 0.4–1.2)
GFR SERPL CREATININE-BSD FRML MDRD: > 90 ML/MIN/1.73M2
GLUCOSE BLD-MCNC: 187 MG/DL (ref 70–108)
GLUCOSE BLD-MCNC: 202 MG/DL (ref 70–108)
GLUCOSE BLD-MCNC: 213 MG/DL (ref 70–108)
GLUCOSE BLD-MCNC: 60 MG/DL (ref 70–108)
POTASSIUM SERPL-SCNC: 4.6 MEQ/L (ref 3.5–5.2)
SODIUM BLD-SCNC: 141 MEQ/L (ref 135–145)

## 2017-08-04 PROCEDURE — A6198 ALGINATE DRESSING > 48 SQ IN: HCPCS

## 2017-08-04 PROCEDURE — G8987 SELF CARE CURRENT STATUS: HCPCS

## 2017-08-04 PROCEDURE — 97165 OT EVAL LOW COMPLEX 30 MIN: CPT

## 2017-08-04 PROCEDURE — 36415 COLL VENOUS BLD VENIPUNCTURE: CPT

## 2017-08-04 PROCEDURE — 82948 REAGENT STRIP/BLOOD GLUCOSE: CPT

## 2017-08-04 PROCEDURE — 97110 THERAPEUTIC EXERCISES: CPT

## 2017-08-04 PROCEDURE — 2580000003 HC RX 258: Performed by: INTERNAL MEDICINE

## 2017-08-04 PROCEDURE — 80048 BASIC METABOLIC PNL TOTAL CA: CPT

## 2017-08-04 PROCEDURE — 1200000000 HC SEMI PRIVATE

## 2017-08-04 PROCEDURE — 6370000000 HC RX 637 (ALT 250 FOR IP): Performed by: INTERNAL MEDICINE

## 2017-08-04 PROCEDURE — G8989 SELF CARE D/C STATUS: HCPCS

## 2017-08-04 PROCEDURE — 6360000002 HC RX W HCPCS: Performed by: INTERNAL MEDICINE

## 2017-08-04 RX ORDER — SODIUM CHLORIDE 0.9 % (FLUSH) 0.9 %
10 SYRINGE (ML) INJECTION 2 TIMES DAILY
Status: DISCONTINUED | OUTPATIENT
Start: 2017-08-04 | End: 2017-08-05 | Stop reason: HOSPADM

## 2017-08-04 RX ORDER — INSULIN GLARGINE 100 [IU]/ML
30 INJECTION, SOLUTION SUBCUTANEOUS NIGHTLY
Status: DISCONTINUED | OUTPATIENT
Start: 2017-08-04 | End: 2017-08-05

## 2017-08-04 RX ORDER — INSULIN GLARGINE 100 [IU]/ML
33 INJECTION, SOLUTION SUBCUTANEOUS EVERY MORNING
Status: DISCONTINUED | OUTPATIENT
Start: 2017-08-05 | End: 2017-08-05 | Stop reason: HOSPADM

## 2017-08-04 RX ADMIN — INSULIN GLARGINE 30 UNITS: 100 INJECTION, SOLUTION SUBCUTANEOUS at 21:04

## 2017-08-04 RX ADMIN — INSULIN GLARGINE 30 UNITS: 100 INJECTION, SOLUTION SUBCUTANEOUS at 08:40

## 2017-08-04 RX ADMIN — INSULIN LISPRO 1 UNITS: 100 INJECTION, SOLUTION INTRAVENOUS; SUBCUTANEOUS at 21:04

## 2017-08-04 RX ADMIN — POTASSIUM CHLORIDE AND SODIUM CHLORIDE: 450; 150 INJECTION, SOLUTION INTRAVENOUS at 06:29

## 2017-08-04 RX ADMIN — Medication 4 UNITS: at 13:17

## 2017-08-04 RX ADMIN — SODIUM CHLORIDE, PRESERVATIVE FREE 10 ML: 5 INJECTION INTRAVENOUS at 21:00

## 2017-08-04 RX ADMIN — Medication 4 UNITS: at 17:57

## 2017-08-04 ASSESSMENT — PAIN SCALES - GENERAL
PAINLEVEL_OUTOF10: 0
PAINLEVEL_OUTOF10: 0

## 2017-08-05 VITALS
WEIGHT: 90.2 LBS | TEMPERATURE: 98.8 F | HEART RATE: 120 BPM | HEIGHT: 60 IN | BODY MASS INDEX: 17.71 KG/M2 | OXYGEN SATURATION: 98 % | RESPIRATION RATE: 18 BRPM | SYSTOLIC BLOOD PRESSURE: 130 MMHG | DIASTOLIC BLOOD PRESSURE: 93 MMHG

## 2017-08-05 LAB
CULTURE, STOOL: NORMAL
GLUCOSE BLD-MCNC: 106 MG/DL (ref 70–108)
GLUCOSE BLD-MCNC: 108 MG/DL (ref 70–108)
GLUCOSE BLD-MCNC: 123 MG/DL (ref 70–108)
GLUCOSE BLD-MCNC: 152 MG/DL (ref 70–108)
GLUCOSE BLD-MCNC: 55 MG/DL (ref 70–108)

## 2017-08-05 PROCEDURE — 6370000000 HC RX 637 (ALT 250 FOR IP): Performed by: INTERNAL MEDICINE

## 2017-08-05 PROCEDURE — 82948 REAGENT STRIP/BLOOD GLUCOSE: CPT

## 2017-08-05 RX ORDER — INSULIN GLARGINE 100 [IU]/ML
24 INJECTION, SOLUTION SUBCUTANEOUS NIGHTLY
Status: DISCONTINUED | OUTPATIENT
Start: 2017-08-05 | End: 2017-08-05 | Stop reason: HOSPADM

## 2017-08-05 RX ADMIN — BUTALBITAL, ACETAMINOPHEN, AND CAFFEINE 1 TABLET: 50; 325; 40 TABLET ORAL at 09:51

## 2017-08-05 ASSESSMENT — PAIN SCALES - GENERAL
PAINLEVEL_OUTOF10: 0
PAINLEVEL_OUTOF10: 3

## 2017-08-07 LAB
BLOOD CULTURE, ROUTINE: NORMAL
BLOOD CULTURE, ROUTINE: NORMAL

## 2017-08-15 ENCOUNTER — HOSPITAL ENCOUNTER (OUTPATIENT)
Dept: CT IMAGING | Age: 22
Discharge: HOME | End: 2017-08-15
Payer: COMMERCIAL

## 2017-08-15 DIAGNOSIS — H53.8 BLURRED VISION: ICD-10-CM

## 2017-08-15 PROCEDURE — 6360000004 HC RX CONTRAST MEDICATION: Performed by: NURSE PRACTITIONER

## 2017-08-15 PROCEDURE — 70470 CT HEAD/BRAIN W/O & W/DYE: CPT

## 2017-08-15 RX ADMIN — IOPAMIDOL 65 ML: 755 INJECTION, SOLUTION INTRAVENOUS at 12:38

## 2017-08-22 ENCOUNTER — TELEPHONE (OUTPATIENT)
Dept: INTERNAL MEDICINE CLINIC | Age: 22
End: 2017-08-22

## 2017-10-26 ENCOUNTER — APPOINTMENT (OUTPATIENT)
Dept: GENERAL RADIOLOGY | Age: 22
DRG: 638 | End: 2017-10-26
Payer: COMMERCIAL

## 2017-10-26 ENCOUNTER — HOSPITAL ENCOUNTER (INPATIENT)
Age: 22
LOS: 2 days | Discharge: HOME | DRG: 638 | End: 2017-10-28
Attending: EMERGENCY MEDICINE | Admitting: INTERNAL MEDICINE
Payer: COMMERCIAL

## 2017-10-26 DIAGNOSIS — E10.10 DIABETIC KETOACIDOSIS WITHOUT COMA ASSOCIATED WITH TYPE 1 DIABETES MELLITUS (HCC): Primary | ICD-10-CM

## 2017-10-26 LAB
ALBUMIN SERPL-MCNC: 4.8 G/DL (ref 3.5–5.1)
ALP BLD-CCNC: 103 U/L (ref 38–126)
ALT SERPL-CCNC: 13 U/L (ref 11–66)
AMYLASE: 22 U/L (ref 20–104)
ANION GAP SERPL CALCULATED.3IONS-SCNC: 32 MEQ/L (ref 8–16)
ANISOCYTOSIS: ABNORMAL
AST SERPL-CCNC: 10 U/L (ref 5–40)
BACTERIA: ABNORMAL /HPF
BASE EXCESS MIXED: -11.6 MMOL/L (ref -2–3)
BASOPHILS # BLD: 1.8 %
BASOPHILS ABSOLUTE: 0.1 THOU/MM3 (ref 0–0.1)
BETA-HYDROXYBUTYRATE: 83.38 MG/DL (ref 0.2–2.81)
BILIRUB SERPL-MCNC: 0.4 MG/DL (ref 0.3–1.2)
BILIRUBIN URINE: NEGATIVE
BLOOD, URINE: NEGATIVE
BUN BLDV-MCNC: 30 MG/DL (ref 7–22)
CALCIUM SERPL-MCNC: 9.8 MG/DL (ref 8.5–10.5)
CASTS 2: ABNORMAL /LPF
CASTS UA: ABNORMAL /LPF
CHARACTER, URINE: ABNORMAL
CHLORIDE BLD-SCNC: 90 MEQ/L (ref 98–111)
CO2: 15 MEQ/L (ref 23–33)
COLLECTED BY:: ABNORMAL
COLOR: YELLOW
CREAT SERPL-MCNC: 1 MG/DL (ref 0.4–1.2)
CRYSTALS, UA: ABNORMAL
DEVICE: ABNORMAL
EOSINOPHIL # BLD: 0.2 %
EOSINOPHILS ABSOLUTE: 0 THOU/MM3 (ref 0–0.4)
EPITHELIAL CELLS, UA: ABNORMAL /HPF
GFR SERPL CREATININE-BSD FRML MDRD: 68 ML/MIN/1.73M2
GLUCOSE BLD-MCNC: 323 MG/DL (ref 70–108)
GLUCOSE BLD-MCNC: 421 MG/DL (ref 70–108)
GLUCOSE BLD-MCNC: 435 MG/DL (ref 70–108)
GLUCOSE BLD-MCNC: 464 MG/DL (ref 70–108)
GLUCOSE URINE: >= 1000 MG/DL
HCO3, MIXED: 14 MMOL/L (ref 23–28)
HCT VFR BLD CALC: 38.2 % (ref 37–47)
HEMOGLOBIN: 12.7 GM/DL (ref 12–16)
KETONES, URINE: >= 160
LACTIC ACID: 1.8 MMOL/L (ref 0.5–2.2)
LEUKOCYTE ESTERASE, URINE: ABNORMAL
LIPASE: 15.7 U/L (ref 5.6–51.3)
LYMPHOCYTES # BLD: 26 %
LYMPHOCYTES ABSOLUTE: 1.8 THOU/MM3 (ref 1–4.8)
MAGNESIUM: 1.8 MG/DL (ref 1.6–2.4)
MCH RBC QN AUTO: 29.9 PG (ref 27–31)
MCHC RBC AUTO-ENTMCNC: 33.3 GM/DL (ref 33–37)
MCV RBC AUTO: 89.9 FL (ref 81–99)
MISCELLANEOUS 2: ABNORMAL
MONOCYTES # BLD: 4.9 %
MONOCYTES ABSOLUTE: 0.3 THOU/MM3 (ref 0.4–1.3)
NITRITE, URINE: NEGATIVE
NUCLEATED RED BLOOD CELLS: 0 /100 WBC
O2 SAT, MIXED: 97 %
OSMOLALITY CALCULATION: 300.3 MOSMOL/KG (ref 275–300)
PCO2, MIXED VENOUS: 31 MMHG (ref 41–51)
PDW BLD-RTO: 15 % (ref 11.5–14.5)
PH UA: 5.5
PH, MIXED: 7.27 (ref 7.31–7.41)
PLATELET # BLD: 272 THOU/MM3 (ref 130–400)
PMV BLD AUTO: 8.4 MCM (ref 7.4–10.4)
PO2 MIXED: 97 MMHG (ref 25–40)
POTASSIUM SERPL-SCNC: 4.7 MEQ/L (ref 3.5–5.2)
PREGNANCY, SERUM: NEGATIVE
PROCALCITONIN: 0.06 NG/ML (ref 0.01–0.09)
PROTEIN UA: NEGATIVE
RBC # BLD: 4.25 MILL/MM3 (ref 4.2–5.4)
RBC URINE: ABNORMAL /HPF
RENAL EPITHELIAL, UA: ABNORMAL
SEG NEUTROPHILS: 67.1 %
SEGMENTED NEUTROPHILS ABSOLUTE COUNT: 4.6 THOU/MM3 (ref 1.8–7.7)
SITE: ABNORMAL
SODIUM BLD-SCNC: 137 MEQ/L (ref 135–145)
SPECIFIC GRAVITY, URINE: 1.03 (ref 1–1.03)
TOTAL PROTEIN: 7.8 G/DL (ref 6.1–8)
UROBILINOGEN, URINE: 0.2 EU/DL
WBC # BLD: 6.8 THOU/MM3 (ref 4.8–10.8)
WBC UA: ABNORMAL /HPF
YEAST: ABNORMAL

## 2017-10-26 PROCEDURE — 36415 COLL VENOUS BLD VENIPUNCTURE: CPT

## 2017-10-26 PROCEDURE — 87086 URINE CULTURE/COLONY COUNT: CPT

## 2017-10-26 PROCEDURE — 93005 ELECTROCARDIOGRAM TRACING: CPT

## 2017-10-26 PROCEDURE — 6370000000 HC RX 637 (ALT 250 FOR IP): Performed by: NURSE PRACTITIONER

## 2017-10-26 PROCEDURE — 2060000000 HC ICU INTERMEDIATE R&B

## 2017-10-26 PROCEDURE — 82010 KETONE BODYS QUAN: CPT

## 2017-10-26 PROCEDURE — 99285 EMERGENCY DEPT VISIT HI MDM: CPT

## 2017-10-26 PROCEDURE — 6370000000 HC RX 637 (ALT 250 FOR IP): Performed by: EMERGENCY MEDICINE

## 2017-10-26 PROCEDURE — 81001 URINALYSIS AUTO W/SCOPE: CPT

## 2017-10-26 PROCEDURE — 83690 ASSAY OF LIPASE: CPT

## 2017-10-26 PROCEDURE — 82948 REAGENT STRIP/BLOOD GLUCOSE: CPT

## 2017-10-26 PROCEDURE — 82150 ASSAY OF AMYLASE: CPT

## 2017-10-26 PROCEDURE — 87040 BLOOD CULTURE FOR BACTERIA: CPT

## 2017-10-26 PROCEDURE — 84703 CHORIONIC GONADOTROPIN ASSAY: CPT

## 2017-10-26 PROCEDURE — 83605 ASSAY OF LACTIC ACID: CPT

## 2017-10-26 PROCEDURE — 2580000003 HC RX 258: Performed by: EMERGENCY MEDICINE

## 2017-10-26 PROCEDURE — 84145 PROCALCITONIN (PCT): CPT

## 2017-10-26 PROCEDURE — 96365 THER/PROPH/DIAG IV INF INIT: CPT

## 2017-10-26 PROCEDURE — 83735 ASSAY OF MAGNESIUM: CPT

## 2017-10-26 PROCEDURE — 96366 THER/PROPH/DIAG IV INF ADDON: CPT

## 2017-10-26 PROCEDURE — 80053 COMPREHEN METABOLIC PANEL: CPT

## 2017-10-26 PROCEDURE — 71010 XR CHEST PORTABLE: CPT

## 2017-10-26 PROCEDURE — 96361 HYDRATE IV INFUSION ADD-ON: CPT

## 2017-10-26 PROCEDURE — 85025 COMPLETE CBC W/AUTO DIFF WBC: CPT

## 2017-10-26 RX ORDER — MAGNESIUM 30 MG
30 TABLET ORAL 2 TIMES DAILY
COMMUNITY

## 2017-10-26 RX ORDER — PANTOPRAZOLE SODIUM 40 MG/10ML
40 INJECTION, POWDER, LYOPHILIZED, FOR SOLUTION INTRAVENOUS DAILY
Status: DISCONTINUED | OUTPATIENT
Start: 2017-10-27 | End: 2017-10-26

## 2017-10-26 RX ORDER — SUCRALFATE ORAL 1 G/10ML
1 SUSPENSION ORAL ONCE
Status: COMPLETED | OUTPATIENT
Start: 2017-10-26 | End: 2017-10-26

## 2017-10-26 RX ORDER — DEXTROSE MONOHYDRATE 25 G/50ML
12.5 INJECTION, SOLUTION INTRAVENOUS PRN
Status: DISCONTINUED | OUTPATIENT
Start: 2017-10-26 | End: 2017-10-28 | Stop reason: HOSPADM

## 2017-10-26 RX ORDER — POTASSIUM CHLORIDE 7.45 MG/ML
10 INJECTION INTRAVENOUS PRN
Status: DISCONTINUED | OUTPATIENT
Start: 2017-10-26 | End: 2017-10-27

## 2017-10-26 RX ORDER — 0.9 % SODIUM CHLORIDE 0.9 %
15 INTRAVENOUS SOLUTION INTRAVENOUS ONCE
Status: DISCONTINUED | OUTPATIENT
Start: 2017-10-26 | End: 2017-10-28 | Stop reason: HOSPADM

## 2017-10-26 RX ORDER — DEXTROSE AND SODIUM CHLORIDE 5; .45 G/100ML; G/100ML
INJECTION, SOLUTION INTRAVENOUS CONTINUOUS PRN
Status: DISCONTINUED | OUTPATIENT
Start: 2017-10-26 | End: 2017-10-27

## 2017-10-26 RX ORDER — POTASSIUM CHLORIDE 14.9 MG/ML
40 INJECTION INTRAVENOUS ONCE
COMMUNITY
End: 2017-10-26 | Stop reason: CLARIF

## 2017-10-26 RX ORDER — 0.9 % SODIUM CHLORIDE 0.9 %
30 INTRAVENOUS SOLUTION INTRAVENOUS ONCE
Status: COMPLETED | OUTPATIENT
Start: 2017-10-26 | End: 2017-10-26

## 2017-10-26 RX ORDER — SODIUM CHLORIDE 9 MG/ML
INJECTION, SOLUTION INTRAVENOUS CONTINUOUS
Status: DISCONTINUED | OUTPATIENT
Start: 2017-10-27 | End: 2017-10-28 | Stop reason: HOSPADM

## 2017-10-26 RX ORDER — POTASSIUM CHLORIDE 20 MEQ/1
40 TABLET, EXTENDED RELEASE ORAL DAILY
COMMUNITY

## 2017-10-26 RX ADMIN — SODIUM CHLORIDE 7.2 UNITS/HR: 9 INJECTION, SOLUTION INTRAVENOUS at 21:44

## 2017-10-26 RX ADMIN — SUCRALFATE 1 G: 1 SUSPENSION ORAL at 23:12

## 2017-10-26 RX ADMIN — SODIUM CHLORIDE 1000 ML: 9 INJECTION, SOLUTION INTRAVENOUS at 20:00

## 2017-10-26 ASSESSMENT — ENCOUNTER SYMPTOMS
BACK PAIN: 1
SHORTNESS OF BREATH: 0
WHEEZING: 0
COUGH: 0
RHINORRHEA: 0
ABDOMINAL DISTENTION: 0
EYE ITCHING: 0
VOMITING: 0
ABDOMINAL PAIN: 0
EYE DISCHARGE: 0
NAUSEA: 0
DIARRHEA: 0

## 2017-10-26 ASSESSMENT — PAIN SCALES - GENERAL
PAINLEVEL_OUTOF10: 7
PAINLEVEL_OUTOF10: 0

## 2017-10-26 ASSESSMENT — PAIN DESCRIPTION - LOCATION: LOCATION: BACK;ABDOMEN;HEAD

## 2017-10-26 ASSESSMENT — PAIN DESCRIPTION - ORIENTATION: ORIENTATION: LOWER

## 2017-10-26 ASSESSMENT — PAIN DESCRIPTION - DESCRIPTORS: DESCRIPTORS: ACHING;CONSTANT

## 2017-10-27 PROBLEM — M79.7 FIBROMYALGIA: Status: ACTIVE | Noted: 2017-10-27

## 2017-10-27 PROBLEM — Z87.19 HISTORY OF GASTROESOPHAGEAL REFLUX (GERD): Status: ACTIVE | Noted: 2017-10-27

## 2017-10-27 PROBLEM — R19.7 DIARRHEA: Status: RESOLVED | Noted: 2017-08-01 | Resolved: 2017-10-27

## 2017-10-27 PROBLEM — R63.4 WEIGHT LOSS, UNINTENTIONAL: Status: ACTIVE | Noted: 2017-10-27

## 2017-10-27 PROBLEM — R63.6 UNDERWEIGHT: Status: ACTIVE | Noted: 2017-10-27

## 2017-10-27 PROBLEM — E10.10 TYPE 1 DIABETES MELLITUS WITH KETOACIDOSIS, UNCONTROLLED (HCC): Status: ACTIVE | Noted: 2017-10-27

## 2017-10-27 LAB
ANION GAP SERPL CALCULATED.3IONS-SCNC: 12 MEQ/L (ref 8–16)
ANION GAP SERPL CALCULATED.3IONS-SCNC: 14 MEQ/L (ref 8–16)
ANION GAP SERPL CALCULATED.3IONS-SCNC: 18 MEQ/L (ref 8–16)
ANION GAP SERPL CALCULATED.3IONS-SCNC: 6 MEQ/L (ref 8–16)
BUN BLDV-MCNC: 13 MG/DL (ref 7–22)
BUN BLDV-MCNC: 15 MG/DL (ref 7–22)
BUN BLDV-MCNC: 21 MG/DL (ref 7–22)
BUN BLDV-MCNC: 25 MG/DL (ref 7–22)
CALCIUM SERPL-MCNC: 8.1 MG/DL (ref 8.5–10.5)
CALCIUM SERPL-MCNC: 8.4 MG/DL (ref 8.5–10.5)
CALCIUM SERPL-MCNC: 8.4 MG/DL (ref 8.5–10.5)
CALCIUM SERPL-MCNC: 8.5 MG/DL (ref 8.5–10.5)
CHLORIDE BLD-SCNC: 102 MEQ/L (ref 98–111)
CHLORIDE BLD-SCNC: 104 MEQ/L (ref 98–111)
CHLORIDE BLD-SCNC: 104 MEQ/L (ref 98–111)
CHLORIDE BLD-SCNC: 108 MEQ/L (ref 98–111)
CO2: 19 MEQ/L (ref 23–33)
CO2: 21 MEQ/L (ref 23–33)
CO2: 22 MEQ/L (ref 23–33)
CO2: 23 MEQ/L (ref 23–33)
CREAT SERPL-MCNC: 0.6 MG/DL (ref 0.4–1.2)
CREAT SERPL-MCNC: 0.8 MG/DL (ref 0.4–1.2)
EKG ATRIAL RATE: 124 BPM
EKG P AXIS: 72 DEGREES
EKG P-R INTERVAL: 120 MS
EKG Q-T INTERVAL: 308 MS
EKG QRS DURATION: 78 MS
EKG QTC CALCULATION (BAZETT): 442 MS
EKG R AXIS: 80 DEGREES
EKG T AXIS: -26 DEGREES
EKG VENTRICULAR RATE: 124 BPM
GFR SERPL CREATININE-BSD FRML MDRD: 88 ML/MIN/1.73M2
GFR SERPL CREATININE-BSD FRML MDRD: > 90 ML/MIN/1.73M2
GLUCOSE BLD-MCNC: 103 MG/DL (ref 70–108)
GLUCOSE BLD-MCNC: 132 MG/DL (ref 70–108)
GLUCOSE BLD-MCNC: 138 MG/DL (ref 70–108)
GLUCOSE BLD-MCNC: 138 MG/DL (ref 70–108)
GLUCOSE BLD-MCNC: 144 MG/DL (ref 70–108)
GLUCOSE BLD-MCNC: 160 MG/DL (ref 70–108)
GLUCOSE BLD-MCNC: 172 MG/DL (ref 70–108)
GLUCOSE BLD-MCNC: 183 MG/DL (ref 70–108)
GLUCOSE BLD-MCNC: 195 MG/DL (ref 70–108)
GLUCOSE BLD-MCNC: 195 MG/DL (ref 70–108)
GLUCOSE BLD-MCNC: 197 MG/DL (ref 70–108)
GLUCOSE BLD-MCNC: 202 MG/DL (ref 70–108)
GLUCOSE BLD-MCNC: 219 MG/DL (ref 70–108)
GLUCOSE BLD-MCNC: 223 MG/DL (ref 70–108)
GLUCOSE BLD-MCNC: 223 MG/DL (ref 70–108)
GLUCOSE BLD-MCNC: 226 MG/DL (ref 70–108)
GLUCOSE BLD-MCNC: 229 MG/DL (ref 70–108)
GLUCOSE BLD-MCNC: 230 MG/DL (ref 70–108)
GLUCOSE BLD-MCNC: 266 MG/DL (ref 70–108)
GLUCOSE BLD-MCNC: 57 MG/DL (ref 70–108)
GLUCOSE BLD-MCNC: 83 MG/DL (ref 70–108)
GLUCOSE BLD-MCNC: 84 MG/DL (ref 70–108)
GLUCOSE BLD-MCNC: 85 MG/DL (ref 70–108)
GLUCOSE BLD-MCNC: 85 MG/DL (ref 70–108)
GLUCOSE BLD-MCNC: 87 MG/DL (ref 70–108)
MAGNESIUM: 1.5 MG/DL (ref 1.6–2.4)
MAGNESIUM: 1.8 MG/DL (ref 1.6–2.4)
MAGNESIUM: 2.1 MG/DL (ref 1.6–2.4)
MAGNESIUM: 2.3 MG/DL (ref 1.6–2.4)
MRSA SCREEN RT-PCR: NEGATIVE
PHOSPHORUS: 2.3 MG/DL (ref 2.4–4.7)
PHOSPHORUS: 2.3 MG/DL (ref 2.4–4.7)
PHOSPHORUS: 2.6 MG/DL (ref 2.4–4.7)
PHOSPHORUS: 2.7 MG/DL (ref 2.4–4.7)
POTASSIUM SERPL-SCNC: 3.4 MEQ/L (ref 3.5–5.2)
POTASSIUM SERPL-SCNC: 3.6 MEQ/L (ref 3.5–5.2)
POTASSIUM SERPL-SCNC: 3.8 MEQ/L (ref 3.5–5.2)
POTASSIUM SERPL-SCNC: 3.9 MEQ/L (ref 3.5–5.2)
SODIUM BLD-SCNC: 137 MEQ/L (ref 135–145)
SODIUM BLD-SCNC: 138 MEQ/L (ref 135–145)
SODIUM BLD-SCNC: 139 MEQ/L (ref 135–145)
SODIUM BLD-SCNC: 139 MEQ/L (ref 135–145)
T3 TOTAL: 72 NG/DL (ref 72–181)

## 2017-10-27 PROCEDURE — 99233 SBSQ HOSP IP/OBS HIGH 50: CPT | Performed by: NURSE PRACTITIONER

## 2017-10-27 PROCEDURE — 2580000003 HC RX 258: Performed by: NURSE PRACTITIONER

## 2017-10-27 PROCEDURE — 6360000002 HC RX W HCPCS: Performed by: NURSE PRACTITIONER

## 2017-10-27 PROCEDURE — 99223 1ST HOSP IP/OBS HIGH 75: CPT | Performed by: NURSE PRACTITIONER

## 2017-10-27 PROCEDURE — 84436 ASSAY OF TOTAL THYROXINE: CPT

## 2017-10-27 PROCEDURE — S0028 INJECTION, FAMOTIDINE, 20 MG: HCPCS | Performed by: NURSE PRACTITIONER

## 2017-10-27 PROCEDURE — 36415 COLL VENOUS BLD VENIPUNCTURE: CPT

## 2017-10-27 PROCEDURE — 87641 MR-STAPH DNA AMP PROBE: CPT

## 2017-10-27 PROCEDURE — 84100 ASSAY OF PHOSPHORUS: CPT

## 2017-10-27 PROCEDURE — 84480 ASSAY TRIIODOTHYRONINE (T3): CPT

## 2017-10-27 PROCEDURE — 83735 ASSAY OF MAGNESIUM: CPT

## 2017-10-27 PROCEDURE — 6370000000 HC RX 637 (ALT 250 FOR IP): Performed by: NURSE PRACTITIONER

## 2017-10-27 PROCEDURE — 6370000000 HC RX 637 (ALT 250 FOR IP): Performed by: INTERNAL MEDICINE

## 2017-10-27 PROCEDURE — 87081 CULTURE SCREEN ONLY: CPT

## 2017-10-27 PROCEDURE — 2060000000 HC ICU INTERMEDIATE R&B

## 2017-10-27 PROCEDURE — 80048 BASIC METABOLIC PNL TOTAL CA: CPT

## 2017-10-27 PROCEDURE — 82948 REAGENT STRIP/BLOOD GLUCOSE: CPT

## 2017-10-27 PROCEDURE — 2500000003 HC RX 250 WO HCPCS: Performed by: NURSE PRACTITIONER

## 2017-10-27 RX ORDER — SUCRALFATE ORAL 1 G/10ML
1 SUSPENSION ORAL EVERY 6 HOURS SCHEDULED
Status: DISCONTINUED | OUTPATIENT
Start: 2017-10-27 | End: 2017-10-28 | Stop reason: HOSPADM

## 2017-10-27 RX ORDER — POTASSIUM CHLORIDE 7.45 MG/ML
10 INJECTION INTRAVENOUS
Status: DISCONTINUED | OUTPATIENT
Start: 2017-10-27 | End: 2017-10-27

## 2017-10-27 RX ORDER — POTASSIUM CHLORIDE 20 MEQ/1
40 TABLET, EXTENDED RELEASE ORAL ONCE
Status: COMPLETED | OUTPATIENT
Start: 2017-10-27 | End: 2017-10-27

## 2017-10-27 RX ORDER — INSULIN GLARGINE 100 [IU]/ML
40 INJECTION, SOLUTION SUBCUTANEOUS ONCE
Status: COMPLETED | OUTPATIENT
Start: 2017-10-27 | End: 2017-10-27

## 2017-10-27 RX ORDER — DEXTROSE MONOHYDRATE 25 G/50ML
12.5 INJECTION, SOLUTION INTRAVENOUS PRN
Status: DISCONTINUED | OUTPATIENT
Start: 2017-10-27 | End: 2017-10-28 | Stop reason: HOSPADM

## 2017-10-27 RX ORDER — TRAMADOL HYDROCHLORIDE 50 MG/1
50 TABLET ORAL EVERY 6 HOURS PRN
Status: DISCONTINUED | OUTPATIENT
Start: 2017-10-27 | End: 2017-10-28 | Stop reason: HOSPADM

## 2017-10-27 RX ORDER — MAGNESIUM 30 MG
30 TABLET ORAL 2 TIMES DAILY
Status: DISCONTINUED | OUTPATIENT
Start: 2017-10-27 | End: 2017-10-27 | Stop reason: CLARIF

## 2017-10-27 RX ORDER — M-VIT,TX,IRON,MINS/CALC/FOLIC 27MG-0.4MG
1 TABLET ORAL DAILY
Status: DISCONTINUED | OUTPATIENT
Start: 2017-10-27 | End: 2017-10-28 | Stop reason: HOSPADM

## 2017-10-27 RX ORDER — DULOXETIN HYDROCHLORIDE 30 MG/1
30 CAPSULE, DELAYED RELEASE ORAL DAILY
Status: DISCONTINUED | OUTPATIENT
Start: 2017-10-27 | End: 2017-10-28 | Stop reason: HOSPADM

## 2017-10-27 RX ORDER — MAGNESIUM SULFATE IN WATER 40 MG/ML
2 INJECTION, SOLUTION INTRAVENOUS ONCE
Status: COMPLETED | OUTPATIENT
Start: 2017-10-27 | End: 2017-10-27

## 2017-10-27 RX ORDER — NICOTINE POLACRILEX 4 MG
15 LOZENGE BUCCAL PRN
Status: DISCONTINUED | OUTPATIENT
Start: 2017-10-27 | End: 2017-10-28 | Stop reason: HOSPADM

## 2017-10-27 RX ORDER — DULOXETIN HYDROCHLORIDE 30 MG/1
30 CAPSULE, DELAYED RELEASE ORAL DAILY
COMMUNITY

## 2017-10-27 RX ORDER — DEXTROSE MONOHYDRATE 50 MG/ML
100 INJECTION, SOLUTION INTRAVENOUS PRN
Status: DISCONTINUED | OUTPATIENT
Start: 2017-10-27 | End: 2017-10-28 | Stop reason: HOSPADM

## 2017-10-27 RX ORDER — POTASSIUM CHLORIDE 7.45 MG/ML
10 INJECTION INTRAVENOUS
Status: COMPLETED | OUTPATIENT
Start: 2017-10-27 | End: 2017-10-27

## 2017-10-27 RX ADMIN — SUCRALFATE 1 G: 1 SUSPENSION ORAL at 06:09

## 2017-10-27 RX ADMIN — Medication 30 ML: at 03:43

## 2017-10-27 RX ADMIN — INSULIN GLARGINE 40 UNITS: 100 INJECTION, SOLUTION SUBCUTANEOUS at 16:42

## 2017-10-27 RX ADMIN — DEXTROSE AND SODIUM CHLORIDE: 5; 450 INJECTION, SOLUTION INTRAVENOUS at 14:08

## 2017-10-27 RX ADMIN — DULOXETINE HYDROCHLORIDE 30 MG: 30 CAPSULE, DELAYED RELEASE ORAL at 15:32

## 2017-10-27 RX ADMIN — POTASSIUM CHLORIDE 10 MEQ: 10 INJECTION, SOLUTION INTRAVENOUS at 04:31

## 2017-10-27 RX ADMIN — SUCRALFATE 1 G: 1 SUSPENSION ORAL at 18:06

## 2017-10-27 RX ADMIN — SODIUM PHOSPHATE, MONOBASIC, MONOHYDRATE 10 MMOL: 276; 142 INJECTION, SOLUTION INTRAVENOUS at 03:17

## 2017-10-27 RX ADMIN — POTASSIUM CHLORIDE 10 MEQ: 10 INJECTION, SOLUTION INTRAVENOUS at 02:17

## 2017-10-27 RX ADMIN — MULTIPLE VITAMINS W/ MINERALS TAB 1 TABLET: TAB at 11:38

## 2017-10-27 RX ADMIN — SODIUM PHOSPHATE, MONOBASIC, MONOHYDRATE 10 MMOL: 276; 142 INJECTION, SOLUTION INTRAVENOUS at 11:38

## 2017-10-27 RX ADMIN — SUCRALFATE 1 G: 1 SUSPENSION ORAL at 23:14

## 2017-10-27 RX ADMIN — SUCRALFATE 1 G: 1 SUSPENSION ORAL at 11:38

## 2017-10-27 RX ADMIN — INSULIN LISPRO 7 UNITS: 100 INJECTION, SOLUTION INTRAVENOUS; SUBCUTANEOUS at 17:55

## 2017-10-27 RX ADMIN — FAMOTIDINE 20 MG: 10 INJECTION, SOLUTION INTRAVENOUS at 08:59

## 2017-10-27 RX ADMIN — MAGNESIUM SULFATE HEPTAHYDRATE 2 G: 40 INJECTION, SOLUTION INTRAVENOUS at 02:19

## 2017-10-27 RX ADMIN — POTASSIUM CHLORIDE 10 MEQ: 10 INJECTION, SOLUTION INTRAVENOUS at 11:43

## 2017-10-27 RX ADMIN — POTASSIUM CHLORIDE 40 MEQ: 1500 TABLET, EXTENDED RELEASE ORAL at 15:32

## 2017-10-27 RX ADMIN — FAMOTIDINE 20 MG: 10 INJECTION, SOLUTION INTRAVENOUS at 20:08

## 2017-10-27 RX ADMIN — DEXTROSE AND SODIUM CHLORIDE: 5; 450 INJECTION, SOLUTION INTRAVENOUS at 00:12

## 2017-10-27 RX ADMIN — POTASSIUM CHLORIDE 10 MEQ: 10 INJECTION, SOLUTION INTRAVENOUS at 06:09

## 2017-10-27 RX ADMIN — FAMOTIDINE 20 MG: 10 INJECTION, SOLUTION INTRAVENOUS at 00:43

## 2017-10-27 RX ADMIN — DEXTROSE AND SODIUM CHLORIDE: 5; 450 INJECTION, SOLUTION INTRAVENOUS at 07:08

## 2017-10-27 ASSESSMENT — PAIN DESCRIPTION - ONSET
ONSET: GRADUAL
ONSET: GRADUAL

## 2017-10-27 ASSESSMENT — PAIN SCALES - GENERAL
PAINLEVEL_OUTOF10: 4
PAINLEVEL_OUTOF10: 0
PAINLEVEL_OUTOF10: 4
PAINLEVEL_OUTOF10: 0
PAINLEVEL_OUTOF10: 4
PAINLEVEL_OUTOF10: 0

## 2017-10-27 ASSESSMENT — PAIN DESCRIPTION - LOCATION
LOCATION: BACK

## 2017-10-27 ASSESSMENT — PAIN DESCRIPTION - PAIN TYPE
TYPE: ACUTE PAIN

## 2017-10-27 ASSESSMENT — PAIN DESCRIPTION - ORIENTATION
ORIENTATION: LOWER

## 2017-10-27 ASSESSMENT — PAIN DESCRIPTION - DESCRIPTORS
DESCRIPTORS: ACHING;CONSTANT

## 2017-10-27 ASSESSMENT — PAIN DESCRIPTION - FREQUENCY
FREQUENCY: CONTINUOUS

## 2017-10-27 NOTE — PROGRESS NOTES
Hospitalist Progress Note    Patient:  [facility]      Unit/Bed:4K-15/015-A    YOB: 1995    MRN: [id number]       Acct: [de-identified]     PCP: Manny Bhatia CNP    Date of Admission: 10/26/2017    Chief Complaint: DKA    Hospital Course: admitted with above and she was, in fact, in DKA. Started on insulin gtt per glucose stabilizer. Acidosis nearly resolved. Endocrinology consulted for insulin/BS management    Subjective: In bed and awake. Just finished speaking with Dr. Ivet Tomlinson. She denies CP, SOB, nausea, abd pain, and HA. She does report generalized back pain due to fibromyalgia rated 5/10. Stated she just started taking Cymbalta 1 week ago which was started by rheumatologist in [place]. POC discussed and questions answered.       Medications:  Reviewed    Infusion Medications    dextrose      insulin (HUMAN R) non-weight based infusion 6.2 Units/hr (10/27/17 1333)    sodium chloride      dextrose 5 % and 0.45 % NaCl 150 mL/hr at 10/27/17 1408     Scheduled Medications    sucralfate  1 g Oral 4 times per day    therapeutic multivitamin-minerals  1 tablet Oral Daily    sodium phosphate IVPB  10 mmol Intravenous Once    potassium replacement protocol   Other RX Placeholder    potassium chloride  40 mEq Oral Once    insulin glargine  40 Units Subcutaneous Once    insulin lispro  0-6 Units Subcutaneous TID WC    insulin lispro  0-3 Units Subcutaneous Nightly    DULoxetine  30 mg Oral Daily    famotidine (PEPCID) injection  20 mg Intravenous BID    enoxaparin  40 mg Subcutaneous Daily    sodium chloride  15 mL/kg Intravenous Once     PRN Meds: glucose, dextrose, glucagon (rDNA), dextrose, traMADol, dextrose, insulin regular, dextrose, magnesium sulfate, sodium phosphate IVPB **OR** sodium phosphate IVPB **OR** sodium phosphate IVPB, dextrose 5 % and 0.45 % NaCl      Intake/Output Summary (Last 24 hours) at 10/27/17 6894  Last data filed at 10/27/17 0334   Gross per 24 hour Intake          1693.11 ml   Output              600 ml   Net          1093.11 ml       Diet:  DIET CARB CONTROL; Carb Control: 4 carbs/meal (approximate 1800 kcals/day)    Exam:  /81   Pulse 110   Temp 97.9 °F (36.6 °C) (Oral)   Resp 18   Ht 5' (1.524 m)   Wt 86 lb (39 kg)   LMP 04/19/2017 (Exact Date)   SpO2 100%   BMI 16.80 kg/m²     General appearance: No apparent distress, appears stated age and cooperative. HEENT: Pupils equal, round, and reactive to light. Conjunctivae/corneas clear. Neck: Supple, with full range of motion. No jugular venous distention. Trachea midline. Respiratory:  Normal respiratory effort. Clear to auscultation, bilaterally without Rales/Wheezes/Rhonchi. Cardiovascular: Regular rate and rhythm with normal S1/S2 without soft, low-pitched systolic murmur. Abdomen: Soft, non-tender, non-distended with normal bowel sounds. Musculoskeletal: No clubbing, cyanosis or edema bilaterally. Full range of motion without deformity. Skin: Skin color, texture, turgor normal.  No rashes or lesions. Neurologic:  Neurovascularly intact without any focal sensory/motor deficits. Cranial nerves: II-XII intact, grossly non-focal.  Psychiatric: Alert and oriented, thought content appropriate, normal insight  Capillary Refill: Brisk,< 3 seconds   Peripheral Pulses: +1 palpable, equal bilaterally       Labs:   Recent Labs      10/26/17   2010   WBC  6.8   HGB  12.7   HCT  38.2   PLT  272     Recent Labs      10/27/17   0406  10/27/17   0821  10/27/17   1206   NA  139  137  138   K  3.8  3.9  3.4*   CL  104  108  104   CO2  21*  23  22*   BUN  21  15  13   CREATININE  0.6  0.6  0.6   CALCIUM  8.4*  8.4*  8.1*   PHOS  2.3*  2.7  2.3*     Recent Labs      10/26/17   2010   AST  10   ALT  13   BILITOT  0.4   ALKPHOS  103     No results for input(s): INR in the last 72 hours. No results for input(s): Deboraha Mcgee in the last 72 hours.     Urinalysis:    Lab Results   Component Value Date

## 2017-10-27 NOTE — ED PROVIDER NOTES
[facility]  eMERGENCY dEPARTMENT eNCOUnter          CHIEF COMPLAINT       Chief Complaint   Patient presents with    Other     reumatologist thinks she is going into DKA       Nurses Notes reviewed and I agree except as noted in the HPI. HISTORY OF PRESENT ILLNESS    Shi Holder is a 23 y.o. female who presents to ED for DKA. Seen by PCP and was sent to ED for possible DKA    She felt epigastric cramps since yesterday. She is type I diabetic on Lantus 20 units twice a day in addition to sliding scale by Humalog. She feels tired. She also has lower back discomfort since yesterday. No dysuria or urinary frequency. She has been having worsening dyspepsia. No chest pain. No shortness breath. Still having moderate abdominal cramps. No leg swelling. REVIEW OF SYSTEMS     Review of Systems   Constitutional: Positive for activity change and appetite change. Negative for chills, fatigue and fever. HENT: Negative for congestion, ear discharge, hearing loss, nosebleeds and rhinorrhea. Eyes: Negative for discharge and itching. Respiratory: Negative for cough, shortness of breath and wheezing. Cardiovascular: Negative for chest pain. Gastrointestinal: Negative for abdominal distention, abdominal pain, diarrhea, nausea and vomiting. Endocrine: Negative for cold intolerance. See HPI. Genitourinary: Negative for dysuria and flank pain. Musculoskeletal: Positive for back pain. Negative for arthralgias, myalgias, neck pain and neck stiffness. Skin: Negative for rash and wound. Neurological: Negative for dizziness and weakness. Psychiatric/Behavioral: Negative for agitation and suicidal ideas. PAST MEDICAL HISTORY    has a past medical history of Diabetes ([address] SURGICAL HISTORY      has no past surgical history on file.     CURRENT MEDICATIONS       Previous Medications    BLOOD GLUCOSE CALIBRATION (TRUETRACK GLUCOSE CONTROL VI)    by In Vitro route TempSrc:   Axillary    SpO2: 98% 98%  99%   Weight:       Height:             Patient was seen and evaluated in a timely fashion. NS bolus is given. Accu check 453. ABG show metabolic acidosis. UA ketone > 160. She has DKA. . Insulin drip is started. Admitted to Hospitalist service. CRITICAL CARE:   CRITICAL CARE: There was a high probability of clinically significant/life threatening deterioration in this patient's condition which required my urgent intervention. Total critical care time was 30 minutes. This excludes any time for separately reportable procedures. CONSULTS:  Hospitalist    PROCEDURES:  None    FINAL IMPRESSION      1.  Diabetic ketoacidosis without coma associated with type 1 diabetes mellitus [facility])          DISPOSITION/PLAN   Admit    PATIENT REFERRED TO:  Kamille Vogt, [facility]  [address] [place] [id number]            DISCHARGE MEDICATIONS:  New Prescriptions    No medications on file       (Please note that portions of this note were completed with a voice recognition program.  Efforts were made to edit the dictations but occasionally words are mis-transcribed.)    MD Nedra Akers MD  10/26/17 7800

## 2017-10-27 NOTE — CARE COORDINATION
10/27/17, 9:26 AM    Discharge plan discussed by  and . Discharge plan reviewed with patient/ family. Patient/ family verbalize understanding of discharge plan and are in agreement with plan. Understanding was demonstrated using the teach back method. Potential discharge over weekend. Plans home. No anticipated discharge needs at this time.
bed. Insulin gtt infusing. Comes from home with no services. DME's include her diabetic supplies. She has a PCP, she drives and denies issues getting meds.       Evaluation: no

## 2017-10-27 NOTE — H&P
[address] [place] [address]                             HISTORY AND PHYSICAL    PATIENT NAME: Myles Laguna                      :             1995  MED REC NO:   [id number]                            ROOM:            0015  ACCOUNT NO:   [de-identified]                            ADMISSION DATE:  10/26/2017  PROVIDER:     Roz Soliz Cnp      CHIEF COMPLAINT:  Concerns of DKA, abdominal pain. HISTORY OF PRESENT ILLNESS: Sammy Torres is a 20-year-old   female with a known past medical history significant for diabetes  mellitus type 1, peripheral neuropathy, gastroesophageal reflux  disease, unintentional weight loss and fibromyalgia. Patient was recently here at [facility] on 2017. At that time, the  patient was admitted with DKA with noted noncompliance with  medication. It has been documented that she had poor compliance with  her insulin injections, frequently skipping the doses. The patient tells me that she has had complaints of abdominal pain very vague, off  and on for the last three to four days. She states she has had acidic  taste in the back of her throat, which she states that she has not  eaten for the last two days. She denies any diarrhea. She denies any  nausea or vomiting. The patient tells me she is compliant with taking  her insulin injections and monitoring her blood sugar. The patient  tells me typically her fingerstick glucoses run between 150 and 180. However, this morning her morning fingerstick fasting was 44. The  patient did take her morning insulin. The patient states she has been  seeing rheumatologist for complaints of overall back pain. She was  diagnosed with fibromyalgia, which she tells me they had given her a  prescription for Cymbalta. The patient denies any shortness of  breath. No chest pain or pressure.   She denies any seizure or syncope  or near syncope pleasant and cooperative. The patient does  have some discoloration on her lower back. No redness. No skin  breakdown. She denies any heating pad use or fall. IMPRESSION AND PLAN:  1. DKA, hyperosmolarity with no coma, history of diabetes mellitus type 1. Beta-hydroxybutyrate was 83.3. History of previous admission in 08/2017 for DKA secondary to noncompliance. The patient denies noncompliance during this admission. DKA protocol was  initiated. Consult has been made to Endocrinology and we will further  monitor. 2.  Gastroesophageal reflux disease. The patient is complaining of a  lot of acidic taste in the back of her throat. She states she has  been taking over-the-counter Zantac and Tums. I did go ahead and  start her on Carafate and Pepcid. She may need GI cocktail. This  will be monitored. 3.  Diabetes mellitus type 1, uncontrolled with complications of  peripheral neuropathy. 4.  Unintentional weight loss. The patient states she has lost 12  pounds in the last month, 50 pounds in the last year. Dietary has  been consulted. PLAN:  The patient is a full code. Her medications have been reviewed  and renewed. Consult has been made to Endocrinology. Kipp Dubin Rock Barr, CNP    D: 10/27/2017 3:10:33       T: 10/27/2017 6:29:51     [email or web address]  Job#: [id number]     Doc#: [id number]

## 2017-10-27 NOTE — PLAN OF CARE
Problem: Nutrition  Goal: Optimal nutrition therapy  Outcome: Ongoing  Nutrition Problem: Unintended weight loss  Intervention: Food and/or Nutrient Delivery: Continue current diet, Start ONS (Will send Glucerna once/day once FL diet or greater. Encouraged compliance with diabetic diet to promote blood sugar control.)  Nutritional Goals: Pt. will receive adequate nutrition (FL diet or greater) in next 1-4 days.

## 2017-10-27 NOTE — PLAN OF CARE
Problem: Discharge Planning:  Goal: Discharged to appropriate level of care  Discharged to appropriate level of care  Outcome: Ongoing  Plan to discharge home. Remains on insulin drip. No discharge needs at this time. Problem: Fluid Volume - Imbalance:  Goal: Will remain free of signs and symptoms of dehydration  Will remain free of signs and symptoms of dehydration  Outcome: Ongoing  Patient tolerating clear liquid diet. Skin turgor less than 3 seconds. Goal: Absence of imbalanced fluid volume signs and symptoms  Absence of imbalanced fluid volume signs and symptoms  Outcome: Ongoing  Patient's weight remains stable. Remains free of edema. Problem: Serum Glucose Level - Abnormal:  Goal: Ability to maintain appropriate glucose levels will improve  Ability to maintain appropriate glucose levels will improve  Outcome: Ongoing  Patient remains on insulin drip. Glucose levels remain around 100, A1C remains 19. Comments: Patient demonstrates understanding of plan of care and contributes to goal setting.

## 2017-10-27 NOTE — CONSULTS
never used smokeless tobacco. She reports that she does not drink alcohol or use drugs. family history includes High Cholesterol in her paternal grandmother.   No Known Allergies  Current Facility-Administered Medications   Medication Dose Route Frequency Provider Last Rate Last Dose    sucralfate (CARAFATE) 1 GM/10ML suspension 1 g  1 g Oral 4 times per day Erica Emerson, CNP   1 g at 10/27/17 1138    glucose (GLUTOSE) 40 % oral gel 15 g  15 g Oral PRN Erica Emerson, CNP        dextrose 50 % solution 12.5 g  12.5 g Intravenous PRN Erica Emerson, CNP        glucagon (rDNA) injection 1 mg  1 mg Intramuscular PRN Erica Emerson, CNP        dextrose 5 % solution  100 mL/hr Intravenous PRN Erica Emerson, CNP        therapeutic multivitamin-minerals 1 tablet  1 tablet Oral Daily Erica Emerson CNP   1 tablet at 10/27/17 1138    sodium phosphate 10 mmol in dextrose 5 % 250 mL IVPB  10 mmol Intravenous Once Erica Emerson CNP 62.5 mL/hr at 10/27/17 1138 10 mmol at 10/27/17 1138    potassium replacement protocol   Other RX Placeholder Chris Bo DO        potassium chloride (KLOR-CON M) extended release tablet 40 mEq  40 mEq Oral Once Chris Bo DO        dextrose 50 % solution 12.5 g  12.5 g Intravenous PRN Bernabe Doan MD        famotidine (PEPCID) injection 20 mg  20 mg Intravenous BID Erica Emerson CNP   20 mg at 10/27/17 0859    enoxaparin (LOVENOX) injection 40 mg  40 mg Subcutaneous Daily Erica Emerson CNP        insulin regular (HUMULIN R;NOVOLIN R) 100 Units in sodium chloride 0.9 % 100 mL infusion  0.5 Units/hr Intravenous Continuous Erica Emerson CNP 6.2 mL/hr at 10/27/17 1333 6.2 Units/hr at 10/27/17 1333    insulin regular (HUMULIN R;NOVOLIN R) injection 0-10 Units  0-10 Units Intravenous PRN Erica Emerson CNP       [address] [place] [address] dextrose 50 % solution 12.5 g  12.5 g Intravenous PRN Erica Gearldine Harder, CNP        magnesium sulfate 1 g in dextrose 5 % 100 mL IVPB  1 g Intravenous PRN Erica Gearldine Harder, CNP        sodium phosphate 10 mmol in dextrose 5 % 250 mL IVPB  10 mmol Intravenous PRN Erica Gearldine Harder, CNP   Stopped at 10/27/17 0730    Or    sodium phosphate 15 mmol in dextrose 5 % 250 mL IVPB  15 mmol Intravenous PRN Erica Gearldine Harder, CNP        Or    sodium phosphate 20 mmol in dextrose 5 % 500 mL IVPB  20 mmol Intravenous PRN Erica Gearldine Harder, CNP        0.9 % sodium chloride bolus  15 mL/kg Intravenous Once Erica Gearldine Harder, CNP        Followed by   Brand Makaweli 0.9 % sodium chloride infusion   Intravenous Continuous Erica Gearldine Harder, CNP        dextrose 5 % and 0.45 % sodium chloride infusion   Intravenous Continuous PRN Erica Gearldine Harder,  mL/hr at 10/27/17 1408         Objective:     Vitals:    10/27/17 1116   BP: 126/81   Pulse: 110   Resp: 18   Temp: 97.9 °F (36.6 °C)   SpO2: 100%    Body mass index is 16.8 kg/m². Wt Readings from Last 3 Encounters:   10/26/17 86 lb (39 kg)   08/01/17 90 lb 3.2 oz (40.9 kg)   07/21/17 83 lb (37.6 kg)    Admission weight: 82 lb (37.2 kg)    General:  alert, appears stated age and cooperative   Oropharynx: lips, mucosa, and tongue normal; teeth and gums normal    Eyes:  conjunctivae/corneas clear. PERRL, EOM's intact. Fundi benign.     Ears:  normal TM's and external ear canals both ears   Neck: no adenopathy, no carotid bruit, no JVD, supple, symmetrical, trachea midline and thyroid not enlarged, symmetric, no tenderness/mass/nodules   Thyroid:  no palpable nodule   Lung: clear to auscultation bilaterallyPercussion: Normal   Heart:  regular rate and rhythm, S1, S2 normal, no murmur, click, rub or gallopApical impulse normal   Abdomen: soft, non-tender; bowel sounds normal; no masses,  no organomegaly Extremities: extremities normal, atraumatic, no cyanosis or edema   Skin: warm and dry, no hyperpigmentation, vitiligo, or suspicious lesions   Pulses: 2+ and symmetric   Neuro: normal without focal findings, mental status, speech normal, alert and oriented x3, RANDY and reflexes normal and symmetric     Lab Review  [unfilled]  Lab Results   Component Value Date    TSH 3.280 08/01/2017     No results found for: TESTOSTERONE  CBC:  Lab Results   Component Value Date    WBC 6.8 10/26/2017    RBC 4.25 10/26/2017    HGB 12.7 10/26/2017    HCT 38.2 10/26/2017    MCV 89.9 10/26/2017    MCH 29.9 10/26/2017    MCHC 33.3 10/26/2017    RDW 15.0 10/26/2017     10/26/2017    MPV 8.4 10/26/2017     CMP:    Lab Results   Component Value Date     10/27/2017    K 3.4 10/27/2017     10/27/2017    CO2 22 10/27/2017    BUN 13 10/27/2017    CREATININE 0.6 10/27/2017    LABGLOM >90 10/27/2017    GLUCOSE 223 10/27/2017    PROT 7.8 10/26/2017    LABALBU 4.8 10/26/2017    CALCIUM 8.1 10/27/2017    BILITOT 0.4 10/26/2017    ALKPHOS 103 10/26/2017    AST 10 10/26/2017    ALT 13 10/26/2017     Hepatic Function Panel:    Lab Results   Component Value Date    ALKPHOS 103 10/26/2017    ALT 13 10/26/2017    AST 10 10/26/2017    PROT 7.8 10/26/2017    BILITOT 0.4 10/26/2017    BILIDIR <0.2 08/01/2017    LABALBU 4.8 10/26/2017     Magnesium:    Lab Results   Component Value Date    MG 1.8 10/27/2017     PT/INR:  No results found for: PROTIME, INR  HgBA1c:    Lab Results   Component Value Date    LABA1C 19.5 08/01/2017     TSH:    Lab Results   Component Value Date    TSH 3.280 08/01/2017   ,   No results for input(s): CKTOTAL, CKMB, CKMBINDEX, TROPONINI in the last 72 hours.   FLP:  No results found for: TRIG, HDL, LDLCALC, LDLDIRECT, LABVLDL  DIET: DIET CLEAR LIQUID; Carb Control: 4 carbs/meal (approximate 1800 kcals/day)  Assessment:   Active Problems:    DKA (diabetic ketoacidoses) (HCC)    Weight loss, unintentional History of gastroesophageal reflux (GERD)    Type 1 diabetes mellitus with ketoacidosis, uncontrolled (HCC)    Fibromyalgia    Underweight    Diabetic ketoacidosis without coma associated with type 1 diabetes mellitus ([facility] [address]       Diabetes Mellitus type 1, under poor control. AiC 19.5%     Plan:    1800 calorie diet  4 servings of carbs with each meal  Lantus 40 U stat and daily HS  Humalog I:7 gms plus SS level one  \"Thank you for asking us to see this complex patient. \"

## 2017-10-28 VITALS
RESPIRATION RATE: 18 BRPM | WEIGHT: 85.5 LBS | SYSTOLIC BLOOD PRESSURE: 129 MMHG | HEIGHT: 60 IN | DIASTOLIC BLOOD PRESSURE: 88 MMHG | OXYGEN SATURATION: 97 % | HEART RATE: 133 BPM | BODY MASS INDEX: 16.78 KG/M2 | TEMPERATURE: 98.7 F

## 2017-10-28 PROBLEM — E10.10 TYPE 1 DIABETES MELLITUS WITH KETOACIDOSIS, UNCONTROLLED (HCC): Status: RESOLVED | Noted: 2017-10-27 | Resolved: 2017-10-28

## 2017-10-28 PROBLEM — E11.10 DKA (DIABETIC KETOACIDOSES): Status: RESOLVED | Noted: 2017-08-01 | Resolved: 2017-10-28

## 2017-10-28 LAB
ANION GAP SERPL CALCULATED.3IONS-SCNC: 11 MEQ/L (ref 8–16)
ANISOCYTOSIS: ABNORMAL
BASOPHILS # BLD: 0.6 %
BASOPHILS ABSOLUTE: 0 THOU/MM3 (ref 0–0.1)
BUN BLDV-MCNC: 6 MG/DL (ref 7–22)
CALCIUM SERPL-MCNC: 8.9 MG/DL (ref 8.5–10.5)
CHLORIDE BLD-SCNC: 105 MEQ/L (ref 98–111)
CO2: 25 MEQ/L (ref 23–33)
CREAT SERPL-MCNC: 0.4 MG/DL (ref 0.4–1.2)
EOSINOPHIL # BLD: 0.4 %
EOSINOPHILS ABSOLUTE: 0 THOU/MM3 (ref 0–0.4)
FERRITIN: 102 NG/ML (ref 10–291)
GFR SERPL CREATININE-BSD FRML MDRD: > 90 ML/MIN/1.73M2
GLUCOSE BLD-MCNC: 116 MG/DL (ref 70–108)
GLUCOSE BLD-MCNC: 130 MG/DL (ref 70–108)
GLUCOSE BLD-MCNC: 56 MG/DL (ref 70–108)
GLUCOSE BLD-MCNC: 67 MG/DL (ref 70–108)
GLUCOSE BLD-MCNC: 75 MG/DL (ref 70–108)
HCT VFR BLD CALC: 32 % (ref 37–47)
HEMOGLOBIN: 11 GM/DL (ref 12–16)
IRON: 88 UG/DL (ref 50–170)
LYMPHOCYTES # BLD: 46 %
LYMPHOCYTES ABSOLUTE: 3.1 THOU/MM3 (ref 1–4.8)
MAGNESIUM: 1.7 MG/DL (ref 1.6–2.4)
MCH RBC QN AUTO: 30.2 PG (ref 27–31)
MCHC RBC AUTO-ENTMCNC: 34.4 GM/DL (ref 33–37)
MCV RBC AUTO: 87.6 FL (ref 81–99)
MONOCYTES # BLD: 7.7 %
MONOCYTES ABSOLUTE: 0.5 THOU/MM3 (ref 0.4–1.3)
NUCLEATED RED BLOOD CELLS: 0 /100 WBC
ORGANISM: ABNORMAL
PDW BLD-RTO: 14.8 % (ref 11.5–14.5)
PHOSPHORUS: 2.4 MG/DL (ref 2.4–4.7)
PLATELET # BLD: 228 THOU/MM3 (ref 130–400)
PMV BLD AUTO: 7.7 MCM (ref 7.4–10.4)
POTASSIUM SERPL-SCNC: 3.9 MEQ/L (ref 3.5–5.2)
RBC # BLD: 3.65 MILL/MM3 (ref 4.2–5.4)
RETICULOCYTE ABSOLUTE COUNT: 0.9 % (ref 0.5–2)
SEG NEUTROPHILS: 45.3 %
SEGMENTED NEUTROPHILS ABSOLUTE COUNT: 3.1 THOU/MM3 (ref 1.8–7.7)
SODIUM BLD-SCNC: 141 MEQ/L (ref 135–145)
T4 FREE: 1.42 NG/DL (ref 0.93–1.76)
THYROXINE (T4): 5.5 UG/DL (ref 4.5–12)
TOTAL IRON BINDING CAPACITY: 253 UG/DL (ref 171–450)
URINE CULTURE REFLEX: ABNORMAL
VITAMIN D 25-HYDROXY: 26 NG/ML (ref 30–100)
WBC # BLD: 6.8 THOU/MM3 (ref 4.8–10.8)

## 2017-10-28 PROCEDURE — 6370000000 HC RX 637 (ALT 250 FOR IP): Performed by: NURSE PRACTITIONER

## 2017-10-28 PROCEDURE — 99238 HOSP IP/OBS DSCHRG MGMT 30/<: CPT | Performed by: NURSE PRACTITIONER

## 2017-10-28 PROCEDURE — 82728 ASSAY OF FERRITIN: CPT

## 2017-10-28 PROCEDURE — 82306 VITAMIN D 25 HYDROXY: CPT

## 2017-10-28 PROCEDURE — 2500000003 HC RX 250 WO HCPCS: Performed by: NURSE PRACTITIONER

## 2017-10-28 PROCEDURE — 83540 ASSAY OF IRON: CPT

## 2017-10-28 PROCEDURE — 85025 COMPLETE CBC W/AUTO DIFF WBC: CPT

## 2017-10-28 PROCEDURE — 85045 AUTOMATED RETICULOCYTE COUNT: CPT

## 2017-10-28 PROCEDURE — 82948 REAGENT STRIP/BLOOD GLUCOSE: CPT

## 2017-10-28 PROCEDURE — 84100 ASSAY OF PHOSPHORUS: CPT

## 2017-10-28 PROCEDURE — 84439 ASSAY OF FREE THYROXINE: CPT

## 2017-10-28 PROCEDURE — 83735 ASSAY OF MAGNESIUM: CPT

## 2017-10-28 PROCEDURE — 36415 COLL VENOUS BLD VENIPUNCTURE: CPT

## 2017-10-28 PROCEDURE — 83550 IRON BINDING TEST: CPT

## 2017-10-28 PROCEDURE — S0028 INJECTION, FAMOTIDINE, 20 MG: HCPCS | Performed by: NURSE PRACTITIONER

## 2017-10-28 PROCEDURE — 80048 BASIC METABOLIC PNL TOTAL CA: CPT

## 2017-10-28 RX ORDER — INSULIN GLARGINE 100 [IU]/ML
35 INJECTION, SOLUTION SUBCUTANEOUS NIGHTLY
Qty: 1 VIAL | Refills: 0
Start: 2017-10-28

## 2017-10-28 RX ORDER — INSULIN GLARGINE 100 [IU]/ML
35 INJECTION, SOLUTION SUBCUTANEOUS NIGHTLY
Status: DISCONTINUED | OUTPATIENT
Start: 2017-10-28 | End: 2017-10-28 | Stop reason: HOSPADM

## 2017-10-28 RX ADMIN — SUCRALFATE 1 G: 1 SUSPENSION ORAL at 13:18

## 2017-10-28 RX ADMIN — DULOXETINE HYDROCHLORIDE 30 MG: 30 CAPSULE, DELAYED RELEASE ORAL at 08:41

## 2017-10-28 RX ADMIN — SUCRALFATE 1 G: 1 SUSPENSION ORAL at 05:41

## 2017-10-28 RX ADMIN — MULTIPLE VITAMINS W/ MINERALS TAB 1 TABLET: TAB at 08:41

## 2017-10-28 RX ADMIN — INSULIN LISPRO 4 UNITS: 100 INJECTION, SOLUTION INTRAVENOUS; SUBCUTANEOUS at 10:56

## 2017-10-28 RX ADMIN — TRAMADOL HYDROCHLORIDE 50 MG: 50 TABLET, FILM COATED ORAL at 02:44

## 2017-10-28 RX ADMIN — FAMOTIDINE 20 MG: 10 INJECTION, SOLUTION INTRAVENOUS at 08:41

## 2017-10-28 ASSESSMENT — PAIN DESCRIPTION - ORIENTATION: ORIENTATION: LOWER

## 2017-10-28 ASSESSMENT — PAIN DESCRIPTION - FREQUENCY: FREQUENCY: CONTINUOUS

## 2017-10-28 ASSESSMENT — PAIN DESCRIPTION - LOCATION
LOCATION: ABDOMEN
LOCATION: BACK

## 2017-10-28 ASSESSMENT — PAIN DESCRIPTION - PAIN TYPE
TYPE: ACUTE PAIN
TYPE: ACUTE PAIN

## 2017-10-28 ASSESSMENT — PAIN SCALES - GENERAL
PAINLEVEL_OUTOF10: 2
PAINLEVEL_OUTOF10: 0
PAINLEVEL_OUTOF10: 5

## 2017-10-28 ASSESSMENT — PAIN DESCRIPTION - DESCRIPTORS
DESCRIPTORS: CRAMPING
DESCRIPTORS: ACHING

## 2017-10-28 NOTE — PLAN OF CARE
Problem: Discharge Planning:  Goal: Discharged to appropriate level of care  Discharged to appropriate level of care   Outcome: Ongoing  Plan d/c to home with fiance once patient is medically stable, will continue to monitor. Poss d/c tomorrow. Problem: Fluid Volume - Imbalance:  Goal: Absence of imbalanced fluid volume signs and symptoms  Absence of imbalanced fluid volume signs and symptoms   Outcome: Ongoing  Pt tolerating diet well, pt voiding appropriately, bs active x4, will continue to monitor. Problem: Serum Glucose Level - Abnormal:  Goal: Ability to maintain appropriate glucose levels will improve  Ability to maintain appropriate glucose levels will improve   Outcome: Ongoing  Last BS 57, pt given one jelly sandwich, and apple juice, will repeat BS in 15 mins, chems being check ACHS. Sliding scale and carb coverage with meals. Problem: Nutrition  Goal: Optimal nutrition therapy  Outcome: Ongoing  Last BS 57, pt given one jelly sandwich, and apple juice, will repeat BS in 15 mins, chems being check ACHS. Sliding scale and carb coverage with meals. Problem: Pain:  Goal: Pain level will decrease  Pain level will decrease   Outcome: Ongoing  Pain 5/10 in lower back, pt requested reposition and ice pack, will continue to monitor. Pt taken cymbalta,   Goal: Control of acute pain  Control of acute pain   Outcome: Ongoing  Pain 5/10 in lower back, pt requested reposition and ice pack, will continue to monitor. Pt taken cymbalta,   Goal: Control of chronic pain  Control of chronic pain   Outcome: Ongoing  Pain 5/10 in lower back, pt requested reposition and ice pack, will continue to monitor. Pt taken cymbalta,     Problem: Falls - Risk of  Goal: Absence of falls  Outcome: Met This Shift  Patient absent of falls, patient up as tolerated with steady gait, denies any fatigue or lightheadedness with ambulation. Comments: Care plan reviewed with patient.   Patient verbalizes understanding of the plan of care and contribute to goal setting.

## 2017-10-28 NOTE — CONSULTS
Department of Internal Medicine  Section of Endocrinology   [address]    [address] , [address] [place], [address]  Office Phone [address]  ([phone])  ([address])  3 [facility] Tanya Tom Ha MD, Fellow of [employer] of Endocrinology   Progress Note    Admit Date: 10/26/2017    Reviewed the chart of the last 24 hours:   SUBJECTIVE:     Chief Complaint   Patient presents with    Other     reumatologist thinks she is going into DKA   She was seing the rheumatologist for fibromyelgia  Diabetic ketoacidosis without coma associated with type 1 diabetes mellitus ([address] [E10.10]   Patient Active Problem List   Diagnosis Code    DKA (diabetic ketoacidoses) ([address] E13.10    Weight loss, unintentional R63.4    History of gastroesophageal reflux (GERD) Z87.19    Type 1 diabetes mellitus with ketoacidosis, uncontrolled ([address] E10.10    Fibromyalgia M79.7    Underweight R63.6    Diabetic ketoacidosis without coma associated with type 1 diabetes mellitus ([address] E10.10     <principal problem not specified>  10/26/2017  7:19 PM  Admission weight: 82 lb (37.2 kg)  [id number]  [id number]  4K-15/015-A  He has been referred by Dr Jamie Pringle < B for evaluation of  Type 1 diabetes  Patient has no complaints. .   Medication side effects: none  Patient is eating 100%    Review of Systems  Review of Systems - General ROS: negative   Psychological ROS: negative   Hematological and Lymphatic ROS: No history of blood clots or bleeding disorder.    Respiratory ROS: no cough, shortness of breath, or wheezing   Cardiovascular ROS: no chest pain or dyspnea on exertion   Gastrointestinal ROS: no abdominal pain, change in bowel habits, or black or bloody stools   Genito-Urinary ROS: no dysuria, trouble voiding, or hematuria   Musculoskeletal ROS: negative   Neurological ROS: no TIA or stroke symptoms Dermatological ROS: negative    Past Medical, Surgical, Family, Social and Allergy Histories:  I have reviewed the patient's medical history in detail and updated the computerized patient record. has a past medical history of Diabetes ([address] and Fibromyalgia. has no past surgical history on file. reports that she has quit smoking. Her smoking use included Cigarettes. She has never used smokeless tobacco. She reports that she does not drink alcohol or use drugs. family history includes High Cholesterol in her paternal grandmother.   No Known Allergies  Current Facility-Administered Medications   Medication Dose Route Frequency Provider Last Rate Last Dose    insulin glargine (LANTUS) injection vial 35 Units  35 Units Subcutaneous Nightly Dani Ramsay MD        sucralfate (CARAFATE) 1 GM/10ML suspension 1 g  1 g Oral 4 times per day Ercia Bhatt, CNP   1 g at 10/28/17 0541    glucose (GLUTOSE) 40 % oral gel 15 g  15 g Oral PRN Erica Bhatt CNP        dextrose 50 % solution 12.5 g  12.5 g Intravenous PRN Erica Bhatt CNP        glucagon (rDNA) injection 1 mg  1 mg Intramuscular PRN Erica Bhatt, CNP        dextrose 5 % solution  100 mL/hr Intravenous PRN Erica Bhatt, ALVARO        therapeutic multivitamin-minerals 1 tablet  1 tablet Oral Daily Erica Bhatt CNP   1 tablet at 10/28/17 3331    potassium replacement protocol   Other RX Placeholder Lani De La Rosa DO        insulin lispro (HUMALOG) injection vial 0-6 Units  0-6 Units Subcutaneous TID  Dani Ramsay MD        insulin lispro (HUMALOG) injection vial 0-3 Units  0-3 Units Subcutaneous Nightly Dani Brooke MD        DULoxetine (CYMBALTA) extended release capsule 30 mg  30 mg Oral Daily Agustín Case, NP   30 mg at 10/28/17 0841    traMADol (ULTRAM) tablet 50 mg  50 mg Oral Q6H PRN Agustín Case, NP   50 mg at 10/28/17 0244    insulin lispro (HUMALOG) injection vial 1-10 Units  1-10 Units Subcutaneous TID  Karyle Skipper, MD   4 Units at 10/28/17 1056    dextrose 50 % solution 12.5 g  12.5 g Intravenous PRN Savita Marc MD        famotidine (PEPCID) injection 20 mg  20 mg Intravenous BID Erica Izquierdo CNP   20 mg at 10/28/17 0841    enoxaparin (LOVENOX) injection 40 mg  40 mg Subcutaneous Daily Erica Izquierdo CNP        insulin regular (HUMULIN R;NOVOLIN R) injection 0-10 Units  0-10 Units Intravenous PRN Erica Izquierdo CNP        dextrose 50 % solution 12.5 g  12.5 g Intravenous PRN Erica Izquierdo CNP        magnesium sulfate 1 g in dextrose 5 % 100 mL IVPB  1 g Intravenous PRN Erica Izquierdo CNP        sodium phosphate 10 mmol in dextrose 5 % 250 mL IVPB  10 mmol Intravenous PRN Erica Izquierdo CNP   Stopped at 10/27/17 0730    Or    sodium phosphate 15 mmol in dextrose 5 % 250 mL IVPB  15 mmol Intravenous PRN Erica Izquierdo CNP        Or    sodium phosphate 20 mmol in dextrose 5 % 500 mL IVPB  20 mmol Intravenous PRN Erica Izquierdo CNP        0.9 % sodium chloride bolus  15 mL/kg Intravenous Once Erica Izquierdo CNP        Followed by   [facility] 0.9 % sodium chloride infusion   Intravenous Continuous Erica Izquierdo CNP         Home Meds: [unfilled]  Scheduled Meds:   insulin glargine  35 Units Subcutaneous Nightly    sucralfate  1 g Oral 4 times per day    therapeutic multivitamin-minerals  1 tablet Oral Daily    potassium replacement protocol   Other RX Placeholder    insulin lispro  0-6 Units Subcutaneous TID     insulin lispro  0-3 Units Subcutaneous Nightly    DULoxetine  30 mg Oral Daily    insulin lispro  1-10 Units Subcutaneous TID     famotidine (PEPCID) injection  20 mg Intravenous BID    enoxaparin  40 mg Subcutaneous Daily    sodium chloride  15 mL/kg Intravenous 10/26/2017     Hepatic Function Panel:    Lab Results   Component Value Date    ALKPHOS 103 10/26/2017    ALT 13 10/26/2017    AST 10 10/26/2017    PROT 7.8 10/26/2017    BILITOT 0.4 10/26/2017    BILIDIR <0.2 08/01/2017    LABALBU 4.8 10/26/2017     Magnesium:    Lab Results   Component Value Date    MG 1.7 10/28/2017     PT/INR:  No results found for: PROTIME, INR  HgBA1c:    Lab Results   Component Value Date    LABA1C 19.5 08/01/2017      No results for input(s): CKTOTAL, CKMB, CKMBINDEX, TROPONINI in the last 72 hours.   FLP:  No results found for: TRIG, HDL, LDLCALC, LDLDIRECT, LABVLDL  DIET: DIET CARB CONTROL; Carb Control: 4 carbs/meal (approximate 1800 kcals/day)  Impression:    Diabetes Mellitus Type 1    ASSESSMENT AND PLAN    Anemia work up, Vitamin D level

## 2017-10-28 NOTE — PROGRESS NOTES
Hospitalist Progress Note    Patient:  Christine Little      Unit/Bed:4K-15/015-A    YOB: 1995    MRN: [id number]       Acct: [de-identified]     PCP: Aysha Oneal CNP    Date of Admission: 10/26/2017    Chief Complaint: DKA     Hospital Course: admitted with above and she was, in fact, in DKA. Started on insulin gtt per glucose stabilizer. Acidosis nearly resolved. Endocrinology consulted for insulin/BS management. Insulin gtt stopped 10/27 per endo and started on Lantus and Humalog. Hypoglycemic this am and insulin adjustments per endo. Labs much better. Subjective:  Resting in bed and arouses easily. No pain, SOB, dizziness, chills, or nausea. Reported abd pain earlier this am which has resolved. Nursing reports BS down to 53 this am and insulin adjusted per endo. POC discussed and questions answered. Patient is wanting to go home.       Medications:  Reviewed    Infusion Medications    dextrose      sodium chloride       Scheduled Medications    insulin glargine  35 Units Subcutaneous Nightly    sucralfate  1 g Oral 4 times per day    therapeutic multivitamin-minerals  1 tablet Oral Daily    potassium replacement protocol   Other RX Placeholder    insulin lispro  0-6 Units Subcutaneous TID WC    insulin lispro  0-3 Units Subcutaneous Nightly    DULoxetine  30 mg Oral Daily    insulin lispro  1-10 Units Subcutaneous TID WC    famotidine (PEPCID) injection  20 mg Intravenous BID    enoxaparin  40 mg Subcutaneous Daily    sodium chloride  15 mL/kg Intravenous Once     PRN Meds: glucose, dextrose, glucagon (rDNA), dextrose, traMADol, dextrose, insulin regular, dextrose, magnesium sulfate, sodium phosphate IVPB **OR** sodium phosphate IVPB **OR** sodium phosphate IVPB      Intake/Output Summary (Last 24 hours) at 10/28/17 1118  Last data filed at 10/28/17 0435   Gross per 24 hour   Intake             4273 ml   Output             2675 ml   Net             1598 ml       Diet:  DIET CARB CONTROL; Carb Control: 4 carbs/meal (approximate 1800 kcals/day)    Exam:  BP (!) 148/96   Pulse 115   Temp 98.4 °F (36.9 °C) (Oral)   Resp 16   Ht 5' (1.524 m)   Wt 85 lb 8 oz (38.8 kg)   LMP 04/19/2017 (Exact Date)   SpO2 99%   BMI 16.70 kg/m²     General appearance: No apparent distress, appears stated age and cooperative. HEENT: Pupils equal, round, and reactive to light. Conjunctivae/corneas clear. Neck: Supple, with full range of motion. No jugular venous distention. Trachea midline. Respiratory:  Normal respiratory effort. Clear to auscultation, bilaterally without Rales/Wheezes/Rhonchi. Cardiovascular: Regular rate and rhythm with normal S1/S2 without murmurs, rubs or gallops. Abdomen: Soft, non-tender, non-distended with normal bowel sounds. Musculoskeletal: No clubbing, cyanosis or edema bilaterally. Full range of motion without deformity. Skin: Skin color, texture, turgor normal.  No rashes or lesions. Neurologic:  Neurovascularly intact without any focal sensory/motor deficits. Cranial nerves: II-XII intact, grossly non-focal.  Psychiatric: Alert and oriented, thought content appropriate, normal insight  Peripheral Pulses: +1 palpable, equal bilaterally       Labs:   Recent Labs      10/26/17   2010  10/28/17   0424   WBC  6.8  6.8   HGB  12.7  11.0*   HCT  38.2  32.0*   PLT  272  228     Recent Labs      10/27/17   0821  10/27/17   1206  10/28/17   0424   NA  137  138  141   K  3.9  3.4*  3.9   CL  108  104  105   CO2  23  22*  25   BUN  15  13  6*   CREATININE  0.6  0.6  0.4   CALCIUM  8.4*  8.1*  8.9   PHOS  2.7  2.3*  2.4     Recent Labs      10/26/17   2010   AST  10   ALT  13   BILITOT  0.4   ALKPHOS  103     No results for input(s): INR in the last 72 hours. No results for input(s): Velia Bickers in the last 72 hours.     Urinalysis:    Lab Results   Component Value Date    NITRU NEGATIVE 10/26/2017    WBCUA 25-50 10/26/2017    BACTERIA NONE 10/26/2017    RBCUA 0-2 10/26/2017 BLOODU NEGATIVE 10/26/2017    SPECGRAV 1.015 12/10/2015    GLUCOSEU >= 1000 10/26/2017       Radiology:  XR Chest Portable   Final Result   No acute intrathoracic process. **This report has been created using voice recognition software. It may contain minor errors which are inherent in voice recognition technology. **      Final report electronically signed by Dr. Charmayne Beverage on 10/26/2017 9:02 PM          Diet: DIET CARB CONTROL; Carb Control: 4 carbs/meal (approximate 1800 kcals/day)    DVT prophylaxis: [x] Lovenox                                 [] SCDs                                 [] SQ Heparin                                 [] Encourage ambulation           [] Already on Anticoagulation     Disposition:    [x] Home       [] TCU       [] Rehab       [] Psych       [] SNF       [] [place]       [] Other-    Code Status: Full Code    Assessment/Plan:    Anticipated Discharge in : tomorrow    C/Abraham Peck 110[address] Problems    Diagnosis Date Noted    Weight loss, unintentional [R63.4] 10/27/2017    History of gastroesophageal reflux (GERD) [Z87.19] 10/27/2017    Type 1 diabetes mellitus with ketoacidosis, uncontrolled ([facility] [E10.10] 10/27/2017    Fibromyalgia [M79.7] 10/27/2017    Underweight [R63.6] 10/27/2017    Diabetic ketoacidosis without coma associated with type 1 diabetes mellitus ([address] [E10.10]     DKA (diabetic ketoacidoses) ([facility] [E13.10] 08/01/2017     1. DKA--resolved; labs all much improved; Dr. Ricardo Andersen following; IVF off  2. Type 1 DM, uncontrolled; A1c 18.5 (8/1/17); Dr. Ricardo Andersen managing; Lantus decreased to 35 units and carb counting scale changed to 1 unit per every 10 carbs due to BS 54 this am; hypoglycemic protocol; carb control diet  3. Fibromyalgia--recently diagnosed; follows with rheumatologist in [place]; resume Cymbalta; start Ultram prn  4.  Unintentional weight loss--related to uncontrolled DM; patient reports good appetite; Dietician consulted-at risk

## 2017-10-28 NOTE — DISCHARGE SUMMARY
Hospitalist Discharge Summary    Patient:  Elliott Viera  YOB: 1995    MRN: [id number]   Acct: [de-identified]    Primary Care Physician: Galen Philippe CNP    Admit date:  10/26/2017    Discharge date:  10/28/2017      Discharge Diagnoses:   1. DKA--resolved; labs all much improved; Dr. Sangeeta Tomas following; IVF off  2. Type 1 DM, uncontrolled; A1c 18.5 (8/1/17); Dr. Sangeeta Tomas managing; Lantus decreased to 35 units and carb counting scale changed to 1 unit per every 10 carbs due to BS 54 this am; hypoglycemic protocol; carb control diet  3. Fibromyalgia--recently diagnosed; follows with rheumatologist in [facility]; resume Cymbalta; start Ultram prn  4. Unintentional weight loss--related to uncontrolled DM; patient reports good appetite; Dietician consulted-at risk for malnutrition  5. Underweight--BMI 16.8; encouraged po intake; control DM; dietician consult  6. Non-compliance with medical treatments-emphasized importance of monitoring and managing DM on daily basis    Discharge Medications:     Pablo Richey   Home Medication Instructions [facility] [place], [place]:[id number]    Printed on:10/28/17 1321   Medication Information                      Blood Glucose Calibration (TRUETRACK GLUCOSE CONTROL VI)  by In Vitro route daily             cyclobenzaprine (FLEXERIL) 5 MG tablet  Take 0.25 tablets by mouth as needed             DULoxetine (CYMBALTA) 30 MG extended release capsule  Take 30 mg by mouth daily             FREESTYLE LANCETS MISC  1 each by Does not apply route 3 times daily             Glucose Blood (BLOOD GLUCOSE TEST STRIPS) STRP  FreeStyle Freedom Lite Test Strips. Test blood sugars 3 times daily.              insulin glargine (LANTUS) 100 UNIT/ML injection vial  Inject 35 Units into the skin nightly             insulin lispro (HUMALOG) 100 UNIT/ML injection vial  Inject 0-3 Units into the skin nightly             insulin lispro (HUMALOG) 100 UNIT/ML injection vial  Inject 0-6 Units into the skin 3 times daily (with meals)             insulin lispro (HUMALOG) 100 UNIT/ML injection vial  Inject 1-10 Units into the skin 3 times daily (with meals) Take 1 unit for every 10 carbs eaten in addition to sliding scale coverage             Insulin Pen Needle 30G X 8 MM MISC  1 each by Does not apply route 2 times daily             magnesium 30 MG tablet  Take 30 mg by mouth 2 times daily             Multiple Vitamins-Minerals (THERAPEUTIC MULTIVITAMIN-MINERALS) tablet  Take 1 tablet by mouth daily             potassium chloride (KLOR-CON M) 20 MEQ extended release tablet  Take 40 mEq by mouth daily                 Diet:  DIET CARB CONTROL; Carb Control: 4 carbs/meal (approximate 1800 kcals/day)    Activity:  Up ad dom (Patient can move independently)    Follow-up:  in 1 weeks with Manny Bhatia CNP,  in 7-10 days with Dr. Ivet Tomlinson    Consultants:  Dr. Ramsay-endocrinology    Procedures:  none    Review of systems:  Constitutional: no fever, no night sweats, no fatigue  Head: no headache, no head injury, no migranes. Eye: no blurring of vision, no double vision. Mouth/throat: no ulceration, dysphagia  Lungs: no cough, no shortness of breath, no wheeze  CVS: no palpitation, no chest pain, no shortness of breath  GI: no abdominal pain, no nausea , no vomiting, no constipation  ADAM: no dysuria, frequency and urgency  Musculoskeletal: no joint pain, swelling , stiffness  Endocrine: no polyuria, polydypsia  Hematology: no anemia, no easy brusing or bleeding  Dermatology: no skin rash, no eczema, no prurities,  Psychiatry: no depression, no anxiety  Neurology: no syncope, no seizures        Diagnostic Test:   Xr Chest Portable    Result Date: 10/26/2017  PROCEDURE: XR CHEST PORTABLE CLINICAL INFORMATION: Tachycardia TECHNIQUE: Mobile AP chest radiograph. COMPARISON: PA and lateral chest radiographs 1/12/2016 FINDINGS: Cardiomediastinal silhouette is within normal limits. Lungs are clear.  Soft tissues and osseous structures are unremarkable. No acute intrathoracic process. **This report has been created using voice recognition software. It may contain minor errors which are inherent in voice recognition technology. ** Final report electronically signed by Dr. Ayaka Elizalde on 10/26/2017 9:02 PM      CBC:   Recent Labs      10/28/17   0424   WBC  6.8   HGB  11.0*   PLT  228     BMP:  Recent Labs      10/28/17   0424   NA  141   K  3.9   CL  105   CO2  25   BUN  6*   CREATININE  0.4   GLUCOSE  116*     Calcium:  Recent Labs      10/28/17   0424   CALCIUM  8.9     Ionized Calcium:No results for input(s): IONCA in the last 72 hours. Magnesium:  Recent Labs      10/28/17   0424   MG  1.7     Phosphorus:  Recent Labs      10/28/17   0424   PHOS  2.4     BNP:No results for input(s): BNP in the last 72 hours. Glucose:  Recent Labs      10/28/17   1248   POCGLU  67*     HgbA1C: No results for input(s): LABA1C in the last 72 hours. INR: No results for input(s): INR in the last 72 hours. Hepatic: Recent Labs      10/26/17   2010   ALKPHOS  103   ALT  13   AST  10   PROT  7.8   BILITOT  0.4   LABALBU  4.8     Amylase and Lipase:  Recent Labs      10/26/17   2010   LACTA  1.8   AMYLASE  22     Lactic Acid:   Recent Labs      10/26/17   2010   LACTA  1.8     Troponin: No results for input(s): CKTOTAL, CKMB, TROPONINI in the last 72 hours. BNP: No results for input(s): BNP in the last 72 hours. Lipids: No results for input(s): CHOL, TRIG, HDL, LDLCALC in the last 72 hours. Invalid input(s): LDL  ABGs: No results found for: PHART, PO2ART, LFP3UBC, LHJ9GJH, BEART        PHYSICAL EXAM:  /88   Pulse 133   Temp 98.7 °F (37.1 °C) (Oral)   Resp 18   Ht 5' (1.524 m)   Wt 85 lb 8 oz (38.8 kg)   LMP 04/19/2017 (Exact Date)   SpO2 97%   BMI 16.70 kg/m²   General appearance: No apparent distress, appears stated age and cooperative. HEENT: Pupils equal, round, and reactive to light. Conjunctivae/corneas clear.   Neck: Supple, with full range of motion. No jugular venous distention. Trachea midline. Respiratory:  Normal respiratory effort. Clear to auscultation, bilaterally without Rales/Wheezes/Rhonchi. Cardiovascular: Regular rate and rhythm with normal S1/S2 without murmurs, rubs or gallops. Abdomen: Soft, non-tender, non-distended with normal bowel sounds. Musculoskeletal: No clubbing, cyanosis or edema bilaterally. Full range of motion without deformity. Skin: Skin color, texture, turgor normal.  No rashes or lesions. Neurologic:  Neurovascularly intact without any focal sensory/motor deficits. Cranial nerves: II-XII intact, grossly non-focal.  Psychiatric: Alert and oriented, thought content appropriate, normal insight  Peripheral Pulses: +1 palpable, equal bilaterally     Weight: Weight: 85 lb 8 oz (38.8 kg)     24 hour intake/output:  Intake/Output Summary (Last 24 hours) at 10/28/17 1321  Last data filed at 10/28/17 0435   Gross per 24 hour   Intake             4273 ml   Output             2675 ml   Net             1598 ml                 Hospital Course:  Ms. Henri Sanderson was admitted on 10/26/2017 with complaint of thinking she was in DKA and abdominal pain. She has a history of type 1 DM and has had DKA in the past. Labs were consistent with DKA as she had high anion gap acidosis. She was started on IVF and insulin drip per glucose stabilizer protocol. Endocrinology was consulted to assist in management. Insulin drip was discontinued and she was started on Lantus and Humalog sliding scale and carb counting scale. She did have mld hypoglycemia the morning on 10/28 with blood sugar of 53. Insulins were adjusted per endocrinology and she was cleared for discharge. She was discharged home. See home med list for current medications. She should follow up with the providers listed above. All questions were answered. Plan discussed with Dr. Tremayne Goldberg.     Disposition: home    Condition: Stable    Time Spent: Less than 30 minutes        Electronically signed by Ivie Apgar Case CNP on 10/28/2017 at 1:21 PM  Discharging Hospitalist: Dr. Pily Elizalde

## 2017-10-29 LAB
MRSA SCREEN: NORMAL
VRE CULTURE: NORMAL

## 2017-11-01 LAB
BLOOD CULTURE, ROUTINE: NORMAL
BLOOD CULTURE, ROUTINE: NORMAL

## 2017-12-20 ENCOUNTER — APPOINTMENT (OUTPATIENT)
Dept: GENERAL RADIOLOGY | Age: 22
DRG: 638 | End: 2017-12-20
Payer: COMMERCIAL

## 2017-12-20 ENCOUNTER — HOSPITAL ENCOUNTER (INPATIENT)
Age: 22
LOS: 2 days | Discharge: HOME | DRG: 638 | End: 2017-12-22
Attending: EMERGENCY MEDICINE | Admitting: INTERNAL MEDICINE
Payer: COMMERCIAL

## 2017-12-20 DIAGNOSIS — E08.10 DIABETIC KETOACIDOSIS WITHOUT COMA ASSOCIATED WITH DIABETES MELLITUS DUE TO UNDERLYING CONDITION (HCC): Primary | ICD-10-CM

## 2017-12-20 DIAGNOSIS — E87.1 HYPONATREMIA: ICD-10-CM

## 2017-12-20 DIAGNOSIS — E87.20 METABOLIC ACIDOSIS: ICD-10-CM

## 2017-12-20 DIAGNOSIS — E87.5 HYPERKALEMIA: ICD-10-CM

## 2017-12-20 DIAGNOSIS — M79.7 FIBROMYALGIA: ICD-10-CM

## 2017-12-20 DIAGNOSIS — E83.41 HYPERMAGNESEMIA: ICD-10-CM

## 2017-12-20 DIAGNOSIS — E10.9 TYPE 1 DIABETES MELLITUS WITHOUT COMPLICATION (HCC): ICD-10-CM

## 2017-12-20 DIAGNOSIS — E86.0 DEHYDRATION: ICD-10-CM

## 2017-12-20 PROBLEM — E10.10 DKA, TYPE 1, NOT AT GOAL (HCC): Status: ACTIVE | Noted: 2017-12-20

## 2017-12-20 LAB
ALBUMIN SERPL-MCNC: 4.5 G/DL (ref 3.5–5.1)
ALLEN TEST: POSITIVE
ALP BLD-CCNC: 170 U/L (ref 38–126)
ALT SERPL-CCNC: 18 U/L (ref 11–66)
AMPHETAMINE+METHAMPHETAMINE URINE SCREEN: NEGATIVE
ANION GAP SERPL CALCULATED.3IONS-SCNC: 14 MEQ/L (ref 8–16)
ANION GAP SERPL CALCULATED.3IONS-SCNC: 17 MEQ/L (ref 8–16)
ANION GAP SERPL CALCULATED.3IONS-SCNC: 30 MEQ/L (ref 8–16)
ANION GAP SERPL CALCULATED.3IONS-SCNC: 42 MEQ/L (ref 8–16)
AST SERPL-CCNC: 14 U/L (ref 5–40)
AVERAGE GLUCOSE: 225 MG/DL (ref 70–126)
BACTERIA: ABNORMAL /HPF
BARBITURATE QUANTITATIVE URINE: NEGATIVE
BASE EXCESS (CALCULATED): -13.5 MMOL/L (ref -2.5–2.5)
BASE EXCESS (CALCULATED): -16.7 MMOL/L (ref -2.5–2.5)
BASE EXCESS (CALCULATED): -21.5 MMOL/L (ref -2.5–2.5)
BASE EXCESS (CALCULATED): -3.3 MMOL/L (ref -2.5–2.5)
BASOPHILS # BLD: 0.1 %
BASOPHILS ABSOLUTE: 0 THOU/MM3 (ref 0–0.1)
BENZODIAZEPINE QUANTITATIVE URINE: NEGATIVE
BETA-HYDROXYBUTYRATE: 134.3 MG/DL (ref 0.2–2.81)
BETA-HYDROXYBUTYRATE: 74.54 MG/DL (ref 0.2–2.81)
BILIRUB SERPL-MCNC: 0.4 MG/DL (ref 0.3–1.2)
BILIRUBIN DIRECT: < 0.2 MG/DL (ref 0–0.3)
BILIRUBIN URINE: NEGATIVE
BLOOD, URINE: ABNORMAL
BUN BLDV-MCNC: 26 MG/DL (ref 7–22)
BUN BLDV-MCNC: 30 MG/DL (ref 7–22)
BUN BLDV-MCNC: 40 MG/DL (ref 7–22)
BUN BLDV-MCNC: 44 MG/DL (ref 7–22)
CALCIUM SERPL-MCNC: 7.6 MG/DL (ref 8.5–10.5)
CALCIUM SERPL-MCNC: 8 MG/DL (ref 8.5–10.5)
CALCIUM SERPL-MCNC: 8.4 MG/DL (ref 8.5–10.5)
CALCIUM SERPL-MCNC: 9.5 MG/DL (ref 8.5–10.5)
CANNABINOID QUANTITATIVE URINE: NEGATIVE
CASTS 2: ABNORMAL /LPF
CASTS UA: ABNORMAL /LPF
CHARACTER, URINE: CLEAR
CHLORIDE BLD-SCNC: 106 MEQ/L (ref 98–111)
CHLORIDE BLD-SCNC: 108 MEQ/L (ref 98–111)
CHLORIDE BLD-SCNC: 75 MEQ/L (ref 98–111)
CHLORIDE BLD-SCNC: 96 MEQ/L (ref 98–111)
CO2: 17 MEQ/L (ref 23–33)
CO2: 19 MEQ/L (ref 23–33)
CO2: 4 MEQ/L (ref 23–33)
CO2: 8 MEQ/L (ref 23–33)
COCAINE METABOLITE QUANTITATIVE URINE: NEGATIVE
COLLECTED BY:: ABNORMAL
COLOR: YELLOW
CREAT SERPL-MCNC: 0.5 MG/DL (ref 0.4–1.2)
CREAT SERPL-MCNC: 0.6 MG/DL (ref 0.4–1.2)
CREAT SERPL-MCNC: 0.8 MG/DL (ref 0.4–1.2)
CREAT SERPL-MCNC: 1.2 MG/DL (ref 0.4–1.2)
CRYSTALS, UA: ABNORMAL
DEVICE: ABNORMAL
EKG ATRIAL RATE: 112 BPM
EKG P AXIS: 73 DEGREES
EKG P-R INTERVAL: 128 MS
EKG Q-T INTERVAL: 336 MS
EKG QRS DURATION: 86 MS
EKG QTC CALCULATION (BAZETT): 458 MS
EKG R AXIS: 84 DEGREES
EKG T AXIS: 67 DEGREES
EKG VENTRICULAR RATE: 112 BPM
EOSINOPHIL # BLD: 0 %
EOSINOPHILS ABSOLUTE: 0 THOU/MM3 (ref 0–0.4)
EPITHELIAL CELLS, UA: ABNORMAL /HPF
ETHYL ALCOHOL, SERUM: < 0.01 %
FERRITIN: 111 NG/ML (ref 10–291)
GFR SERPL CREATININE-BSD FRML MDRD: 55 ML/MIN/1.73M2
GFR SERPL CREATININE-BSD FRML MDRD: 88 ML/MIN/1.73M2
GFR SERPL CREATININE-BSD FRML MDRD: > 90 ML/MIN/1.73M2
GFR SERPL CREATININE-BSD FRML MDRD: > 90 ML/MIN/1.73M2
GLUCOSE BLD-MCNC: 100 MG/DL (ref 70–108)
GLUCOSE BLD-MCNC: 106 MG/DL (ref 70–108)
GLUCOSE BLD-MCNC: 114 MG/DL (ref 70–108)
GLUCOSE BLD-MCNC: 138 MG/DL (ref 70–108)
GLUCOSE BLD-MCNC: 148 MG/DL (ref 70–108)
GLUCOSE BLD-MCNC: 169 MG/DL (ref 70–108)
GLUCOSE BLD-MCNC: 180 MG/DL (ref 70–108)
GLUCOSE BLD-MCNC: 202 MG/DL (ref 70–108)
GLUCOSE BLD-MCNC: 211 MG/DL (ref 70–108)
GLUCOSE BLD-MCNC: 217 MG/DL (ref 70–108)
GLUCOSE BLD-MCNC: 225 MG/DL (ref 70–108)
GLUCOSE BLD-MCNC: 251 MG/DL (ref 70–108)
GLUCOSE BLD-MCNC: 298 MG/DL (ref 70–108)
GLUCOSE BLD-MCNC: 329 MG/DL (ref 70–108)
GLUCOSE BLD-MCNC: 419 MG/DL (ref 70–108)
GLUCOSE BLD-MCNC: 421 MG/DL (ref 70–108)
GLUCOSE BLD-MCNC: 600 MG/DL
GLUCOSE BLD-MCNC: 94 MG/DL (ref 70–108)
GLUCOSE BLD-MCNC: 969 MG/DL (ref 70–108)
GLUCOSE BLD-MCNC: > 600 MG/DL (ref 70–108)
GLUCOSE URINE: >= 1000 MG/DL
GLUCOSE, WHOLE BLOOD: 302 MG/DL (ref 70–108)
HBA1C MFR BLD: 9.5 % (ref 4.4–6.4)
HCO3: 12 MMOL/L (ref 23–28)
HCO3: 22 MMOL/L (ref 23–28)
HCO3: 5 MMOL/L (ref 23–28)
HCO3: 9 MMOL/L (ref 23–28)
HCT VFR BLD CALC: 22.9 % (ref 37–47)
HCT VFR BLD CALC: 24.2 % (ref 37–47)
HCT VFR BLD CALC: 35.8 % (ref 37–47)
HEMOGLOBIN: 11.6 GM/DL (ref 12–16)
HEMOGLOBIN: 8.2 GM/DL (ref 12–16)
HEMOGLOBIN: 8.4 GM/DL (ref 12–16)
IRON SATURATION: 10 % (ref 20–50)
IRON: 20 UG/DL (ref 50–170)
KETONES, URINE: >= 160
LACTIC ACID: 1.8 MMOL/L (ref 0.5–2.2)
LEUKOCYTE ESTERASE, URINE: NEGATIVE
LIPASE: 40.5 U/L (ref 5.6–51.3)
LYMPHOCYTES # BLD: 3.7 %
LYMPHOCYTES ABSOLUTE: 0.7 THOU/MM3 (ref 1–4.8)
MAGNESIUM: 1.7 MG/DL (ref 1.6–2.4)
MAGNESIUM: 1.7 MG/DL (ref 1.6–2.4)
MAGNESIUM: 2 MG/DL (ref 1.6–2.4)
MAGNESIUM: 2.6 MG/DL (ref 1.6–2.4)
MCH RBC QN AUTO: 30.6 PG (ref 27–31)
MCH RBC QN AUTO: 31 PG (ref 27–31)
MCH RBC QN AUTO: 31.1 PG (ref 27–31)
MCHC RBC AUTO-ENTMCNC: 32.3 GM/DL (ref 33–37)
MCHC RBC AUTO-ENTMCNC: 34.9 GM/DL (ref 33–37)
MCHC RBC AUTO-ENTMCNC: 35.8 GM/DL (ref 33–37)
MCV RBC AUTO: 86.9 FL (ref 81–99)
MCV RBC AUTO: 88.8 FL (ref 81–99)
MCV RBC AUTO: 94.8 FL (ref 81–99)
MISCELLANEOUS 2: ABNORMAL
MONOCYTES # BLD: 4 %
MONOCYTES ABSOLUTE: 0.8 THOU/MM3 (ref 0.4–1.3)
MRSA SCREEN RT-PCR: NEGATIVE
NITRITE, URINE: NEGATIVE
NUCLEATED RED BLOOD CELLS: 0 /100 WBC
O2 SATURATION: 96 %
O2 SATURATION: 98 %
O2 SATURATION: 98 %
O2 SATURATION: 99 %
OPIATES, URINE: NEGATIVE
OSMOLALITY CALCULATION: 303.6 MOSMOL/KG (ref 275–300)
OXYCODONE: NEGATIVE
PCO2: 15 MMHG (ref 35–45)
PCO2: 19 MMHG (ref 35–45)
PCO2: 25 MMHG (ref 35–45)
PCO2: 41 MMHG (ref 35–45)
PDW BLD-RTO: 13.3 % (ref 11.5–14.5)
PDW BLD-RTO: 13.5 % (ref 11.5–14.5)
PDW BLD-RTO: 14 % (ref 11.5–14.5)
PH BLOOD GAS: 7.16 (ref 7.35–7.45)
PH BLOOD GAS: 7.27 (ref 7.35–7.45)
PH BLOOD GAS: 7.29 (ref 7.35–7.45)
PH BLOOD GAS: 7.34 (ref 7.35–7.45)
PH UA: 5
PHENCYCLIDINE QUANTITATIVE URINE: NEGATIVE
PHOSPHORUS: 1.4 MG/DL (ref 2.4–4.7)
PHOSPHORUS: 1.5 MG/DL (ref 2.4–4.7)
PHOSPHORUS: 1.9 MG/DL (ref 2.4–4.7)
PLATELET # BLD: 250 THOU/MM3 (ref 130–400)
PLATELET # BLD: 253 THOU/MM3 (ref 130–400)
PLATELET # BLD: 360 THOU/MM3 (ref 130–400)
PMV BLD AUTO: 7.7 MCM (ref 7.4–10.4)
PMV BLD AUTO: 8.2 MCM (ref 7.4–10.4)
PMV BLD AUTO: 9.1 MCM (ref 7.4–10.4)
PO2: 109 MMHG (ref 71–104)
PO2: 113 MMHG (ref 71–104)
PO2: 143 MMHG (ref 71–104)
PO2: 86 MMHG (ref 71–104)
POTASSIUM SERPL-SCNC: 3.5 MEQ/L (ref 3.5–5.2)
POTASSIUM SERPL-SCNC: 3.5 MEQ/L (ref 3.5–5.2)
POTASSIUM SERPL-SCNC: 3.8 MEQ/L (ref 3.5–5.2)
POTASSIUM SERPL-SCNC: 5.9 MEQ/L (ref 3.5–5.2)
PREGNANCY, SERUM: NEGATIVE
PROTEIN UA: NEGATIVE
RBC # BLD: 2.64 MILL/MM3 (ref 4.2–5.4)
RBC # BLD: 2.72 MILL/MM3 (ref 4.2–5.4)
RBC # BLD: 3.78 MILL/MM3 (ref 4.2–5.4)
RBC URINE: ABNORMAL /HPF
RENAL EPITHELIAL, UA: ABNORMAL
RETICULOCYTE ABSOLUTE COUNT: 3.4 % (ref 0.5–2)
SEG NEUTROPHILS: 92.2 %
SEGMENTED NEUTROPHILS ABSOLUTE COUNT: 18 THOU/MM3 (ref 1.8–7.7)
SODIUM BLD-SCNC: 121 MEQ/L (ref 135–145)
SODIUM BLD-SCNC: 134 MEQ/L (ref 135–145)
SODIUM BLD-SCNC: 140 MEQ/L (ref 135–145)
SODIUM BLD-SCNC: 141 MEQ/L (ref 135–145)
SOURCE, BLOOD GAS: ABNORMAL
SPECIFIC GRAVITY, URINE: 1.02 (ref 1–1.03)
TOTAL IRON BINDING CAPACITY: 208 UG/DL (ref 171–450)
TOTAL PROTEIN: 7.5 G/DL (ref 6.1–8)
TSH SERPL DL<=0.05 MIU/L-ACNC: 2.61 UIU/ML (ref 0.4–4.2)
UROBILINOGEN, URINE: 0.2 EU/DL
VITAMIN B-12: 1414 PG/ML (ref 211–911)
WBC # BLD: 13.7 THOU/MM3 (ref 4.8–10.8)
WBC # BLD: 14.8 THOU/MM3 (ref 4.8–10.8)
WBC # BLD: 19.5 THOU/MM3 (ref 4.8–10.8)
WBC UA: ABNORMAL /HPF
YEAST: ABNORMAL

## 2017-12-20 PROCEDURE — 6370000000 HC RX 637 (ALT 250 FOR IP): Performed by: INTERNAL MEDICINE

## 2017-12-20 PROCEDURE — 82010 KETONE BODYS QUAN: CPT

## 2017-12-20 PROCEDURE — 83735 ASSAY OF MAGNESIUM: CPT

## 2017-12-20 PROCEDURE — 99285 EMERGENCY DEPT VISIT HI MDM: CPT

## 2017-12-20 PROCEDURE — 71010 XR CHEST PORTABLE: CPT

## 2017-12-20 PROCEDURE — 85045 AUTOMATED RETICULOCYTE COUNT: CPT

## 2017-12-20 PROCEDURE — 93005 ELECTROCARDIOGRAM TRACING: CPT

## 2017-12-20 PROCEDURE — C9113 INJ PANTOPRAZOLE SODIUM, VIA: HCPCS | Performed by: NURSE PRACTITIONER

## 2017-12-20 PROCEDURE — 6360000002 HC RX W HCPCS: Performed by: INTERNAL MEDICINE

## 2017-12-20 PROCEDURE — G0480 DRUG TEST DEF 1-7 CLASSES: HCPCS

## 2017-12-20 PROCEDURE — C9113 INJ PANTOPRAZOLE SODIUM, VIA: HCPCS | Performed by: SURGERY

## 2017-12-20 PROCEDURE — 85027 COMPLETE CBC AUTOMATED: CPT

## 2017-12-20 PROCEDURE — 2580000003 HC RX 258: Performed by: SURGERY

## 2017-12-20 PROCEDURE — 2580000003 HC RX 258: Performed by: INTERNAL MEDICINE

## 2017-12-20 PROCEDURE — 2500000003 HC RX 250 WO HCPCS

## 2017-12-20 PROCEDURE — 87081 CULTURE SCREEN ONLY: CPT

## 2017-12-20 PROCEDURE — 82248 BILIRUBIN DIRECT: CPT

## 2017-12-20 PROCEDURE — 84443 ASSAY THYROID STIM HORMONE: CPT

## 2017-12-20 PROCEDURE — 2500000003 HC RX 250 WO HCPCS: Performed by: SURGERY

## 2017-12-20 PROCEDURE — 99223 1ST HOSP IP/OBS HIGH 75: CPT | Performed by: INTERNAL MEDICINE

## 2017-12-20 PROCEDURE — 83690 ASSAY OF LIPASE: CPT

## 2017-12-20 PROCEDURE — 36600 WITHDRAWAL OF ARTERIAL BLOOD: CPT

## 2017-12-20 PROCEDURE — 82947 ASSAY GLUCOSE BLOOD QUANT: CPT

## 2017-12-20 PROCEDURE — 85025 COMPLETE CBC W/AUTO DIFF WBC: CPT

## 2017-12-20 PROCEDURE — 87641 MR-STAPH DNA AMP PROBE: CPT

## 2017-12-20 PROCEDURE — 82607 VITAMIN B-12: CPT

## 2017-12-20 PROCEDURE — 83036 HEMOGLOBIN GLYCOSYLATED A1C: CPT

## 2017-12-20 PROCEDURE — 80053 COMPREHEN METABOLIC PANEL: CPT

## 2017-12-20 PROCEDURE — 6360000002 HC RX W HCPCS: Performed by: EMERGENCY MEDICINE

## 2017-12-20 PROCEDURE — 83540 ASSAY OF IRON: CPT

## 2017-12-20 PROCEDURE — 81001 URINALYSIS AUTO W/SCOPE: CPT

## 2017-12-20 PROCEDURE — 84703 CHORIONIC GONADOTROPIN ASSAY: CPT

## 2017-12-20 PROCEDURE — 82803 BLOOD GASES ANY COMBINATION: CPT

## 2017-12-20 PROCEDURE — 80307 DRUG TEST PRSMV CHEM ANLYZR: CPT

## 2017-12-20 PROCEDURE — 87040 BLOOD CULTURE FOR BACTERIA: CPT

## 2017-12-20 PROCEDURE — 83550 IRON BINDING TEST: CPT

## 2017-12-20 PROCEDURE — 36415 COLL VENOUS BLD VENIPUNCTURE: CPT

## 2017-12-20 PROCEDURE — 2000000000 HC ICU R&B

## 2017-12-20 PROCEDURE — 2580000003 HC RX 258: Performed by: EMERGENCY MEDICINE

## 2017-12-20 PROCEDURE — 82728 ASSAY OF FERRITIN: CPT

## 2017-12-20 PROCEDURE — 2580000003 HC RX 258

## 2017-12-20 PROCEDURE — 6360000002 HC RX W HCPCS: Performed by: SURGERY

## 2017-12-20 PROCEDURE — 82747 ASSAY OF FOLIC ACID RBC: CPT

## 2017-12-20 PROCEDURE — 6370000000 HC RX 637 (ALT 250 FOR IP): Performed by: EMERGENCY MEDICINE

## 2017-12-20 PROCEDURE — 6360000002 HC RX W HCPCS

## 2017-12-20 PROCEDURE — 96360 HYDRATION IV INFUSION INIT: CPT

## 2017-12-20 PROCEDURE — 84100 ASSAY OF PHOSPHORUS: CPT

## 2017-12-20 PROCEDURE — 80048 BASIC METABOLIC PNL TOTAL CA: CPT

## 2017-12-20 PROCEDURE — 82948 REAGENT STRIP/BLOOD GLUCOSE: CPT

## 2017-12-20 PROCEDURE — 6360000002 HC RX W HCPCS: Performed by: NURSE PRACTITIONER

## 2017-12-20 PROCEDURE — 83605 ASSAY OF LACTIC ACID: CPT

## 2017-12-20 RX ORDER — MAGNESIUM HYDROXIDE/ALUMINUM HYDROXICE/SIMETHICONE 120; 1200; 1200 MG/30ML; MG/30ML; MG/30ML
30 SUSPENSION ORAL EVERY 6 HOURS PRN
Status: DISCONTINUED | OUTPATIENT
Start: 2017-12-20 | End: 2017-12-22 | Stop reason: HOSPADM

## 2017-12-20 RX ORDER — PANTOPRAZOLE SODIUM 40 MG/10ML
40 INJECTION, POWDER, LYOPHILIZED, FOR SOLUTION INTRAVENOUS EVERY 8 HOURS
Status: DISCONTINUED | OUTPATIENT
Start: 2017-12-20 | End: 2017-12-21 | Stop reason: ALTCHOICE

## 2017-12-20 RX ORDER — SODIUM CHLORIDE 9 MG/ML
INJECTION, SOLUTION INTRAVENOUS CONTINUOUS
Status: DISCONTINUED | OUTPATIENT
Start: 2017-12-20 | End: 2017-12-21

## 2017-12-20 RX ORDER — POTASSIUM CHLORIDE 20MEQ/15ML
40 LIQUID (ML) ORAL ONCE
Status: DISCONTINUED | OUTPATIENT
Start: 2017-12-20 | End: 2017-12-21 | Stop reason: SDUPTHER

## 2017-12-20 RX ORDER — 0.9 % SODIUM CHLORIDE 0.9 %
1000 INTRAVENOUS SOLUTION INTRAVENOUS ONCE
Status: COMPLETED | OUTPATIENT
Start: 2017-12-20 | End: 2017-12-20

## 2017-12-20 RX ORDER — 0.9 % SODIUM CHLORIDE 0.9 %
500 INTRAVENOUS SOLUTION INTRAVENOUS ONCE
Status: COMPLETED | OUTPATIENT
Start: 2017-12-20 | End: 2017-12-20

## 2017-12-20 RX ORDER — PANTOPRAZOLE SODIUM 40 MG/10ML
40 INJECTION, POWDER, LYOPHILIZED, FOR SOLUTION INTRAVENOUS 2 TIMES DAILY
Status: DISCONTINUED | OUTPATIENT
Start: 2017-12-20 | End: 2017-12-20

## 2017-12-20 RX ORDER — POTASSIUM CHLORIDE 7.45 MG/ML
10 INJECTION INTRAVENOUS
Status: DISCONTINUED | OUTPATIENT
Start: 2017-12-20 | End: 2017-12-20 | Stop reason: RX

## 2017-12-20 RX ORDER — 0.9 % SODIUM CHLORIDE 0.9 %
15 INTRAVENOUS SOLUTION INTRAVENOUS ONCE
Status: DISCONTINUED | OUTPATIENT
Start: 2017-12-20 | End: 2017-12-21

## 2017-12-20 RX ORDER — SODIUM CHLORIDE 9 MG/ML
INJECTION, SOLUTION INTRAVENOUS CONTINUOUS
Status: DISCONTINUED | OUTPATIENT
Start: 2017-12-20 | End: 2017-12-20

## 2017-12-20 RX ORDER — DULOXETIN HYDROCHLORIDE 30 MG/1
30 CAPSULE, DELAYED RELEASE ORAL DAILY
Status: DISCONTINUED | OUTPATIENT
Start: 2017-12-20 | End: 2017-12-22 | Stop reason: HOSPADM

## 2017-12-20 RX ORDER — DEXTROSE AND SODIUM CHLORIDE 5; .45 G/100ML; G/100ML
INJECTION, SOLUTION INTRAVENOUS CONTINUOUS PRN
Status: DISCONTINUED | OUTPATIENT
Start: 2017-12-20 | End: 2017-12-22 | Stop reason: HOSPADM

## 2017-12-20 RX ORDER — ONDANSETRON 2 MG/ML
4 INJECTION INTRAMUSCULAR; INTRAVENOUS ONCE
Status: COMPLETED | OUTPATIENT
Start: 2017-12-20 | End: 2017-12-20

## 2017-12-20 RX ORDER — POTASSIUM CHLORIDE 20MEQ/15ML
40 LIQUID (ML) ORAL ONCE
Status: COMPLETED | OUTPATIENT
Start: 2017-12-20 | End: 2017-12-20

## 2017-12-20 RX ORDER — DEXTROSE MONOHYDRATE 25 G/50ML
12.5 INJECTION, SOLUTION INTRAVENOUS PRN
Status: DISCONTINUED | OUTPATIENT
Start: 2017-12-20 | End: 2017-12-22 | Stop reason: HOSPADM

## 2017-12-20 RX ORDER — POTASSIUM CHLORIDE 7.45 MG/ML
10 INJECTION INTRAVENOUS PRN
Status: DISCONTINUED | OUTPATIENT
Start: 2017-12-20 | End: 2017-12-22 | Stop reason: HOSPADM

## 2017-12-20 RX ADMIN — Medication 50 MEQ: at 07:24

## 2017-12-20 RX ADMIN — DEXTROSE AND SODIUM CHLORIDE: 5; 450 INJECTION, SOLUTION INTRAVENOUS at 13:06

## 2017-12-20 RX ADMIN — SODIUM BICARBONATE 50 MEQ: 84 INJECTION, SOLUTION INTRAVENOUS at 13:58

## 2017-12-20 RX ADMIN — Medication 9.9 UNITS/HR: at 15:29

## 2017-12-20 RX ADMIN — Medication 10.8 UNITS/HR: at 05:30

## 2017-12-20 RX ADMIN — PANTOPRAZOLE SODIUM 40 MG: 40 INJECTION, POWDER, FOR SOLUTION INTRAVENOUS at 20:14

## 2017-12-20 RX ADMIN — PANTOPRAZOLE SODIUM 40 MG: 40 INJECTION, POWDER, FOR SOLUTION INTRAVENOUS at 11:12

## 2017-12-20 RX ADMIN — Medication 10.8 UNITS/HR: at 04:35

## 2017-12-20 RX ADMIN — CEFTRIAXONE SODIUM 1 G: 10 INJECTION, POWDER, FOR SOLUTION INTRAVENOUS at 14:29

## 2017-12-20 RX ADMIN — SODIUM CHLORIDE 1000 ML: 9 INJECTION, SOLUTION INTRAVENOUS at 02:31

## 2017-12-20 RX ADMIN — ALUMINUM HYDROXIDE, MAGNESIUM HYDROXIDE, AND SIMETHICONE 30 ML: 200; 200; 20 SUSPENSION ORAL at 23:27

## 2017-12-20 RX ADMIN — HYDROMORPHONE HYDROCHLORIDE 0.5 MG: 1 INJECTION, SOLUTION INTRAMUSCULAR; INTRAVENOUS; SUBCUTANEOUS at 14:45

## 2017-12-20 RX ADMIN — SODIUM CHLORIDE 1000 ML: 9 INJECTION, SOLUTION INTRAVENOUS at 03:59

## 2017-12-20 RX ADMIN — SODIUM CHLORIDE: 9 INJECTION, SOLUTION INTRAVENOUS at 09:28

## 2017-12-20 RX ADMIN — ONDANSETRON HYDROCHLORIDE 4 MG: 2 INJECTION, SOLUTION INTRAVENOUS at 09:57

## 2017-12-20 RX ADMIN — SODIUM BICARBONATE: 84 INJECTION, SOLUTION INTRAVENOUS at 08:18

## 2017-12-20 RX ADMIN — DEXTROSE AND SODIUM CHLORIDE: 5; 450 INJECTION, SOLUTION INTRAVENOUS at 20:14

## 2017-12-20 RX ADMIN — ENOXAPARIN SODIUM 40 MG: 40 INJECTION SUBCUTANEOUS at 09:53

## 2017-12-20 RX ADMIN — SODIUM CHLORIDE 500 ML: 9 INJECTION, SOLUTION INTRAVENOUS at 07:05

## 2017-12-20 RX ADMIN — POTASSIUM CHLORIDE 20 MEQ: 2 INJECTION, SOLUTION, CONCENTRATE INTRAVENOUS at 12:59

## 2017-12-20 RX ADMIN — SODIUM PHOSPHATE, MONOBASIC, MONOHYDRATE AND SODIUM PHOSPHATE, DIBASIC, ANHYDROUS 15 MMOL: 276; 142 INJECTION, SOLUTION INTRAVENOUS at 10:30

## 2017-12-20 RX ADMIN — POTASSIUM CHLORIDE 40 MEQ: 40 SOLUTION ORAL at 11:18

## 2017-12-20 RX ADMIN — SODIUM CHLORIDE: 9 INJECTION, SOLUTION INTRAVENOUS at 05:30

## 2017-12-20 ASSESSMENT — ENCOUNTER SYMPTOMS
CHEST TIGHTNESS: 0
BACK PAIN: 0
CONSTIPATION: 0
WHEEZING: 0
BLOOD IN STOOL: 0
EYE ITCHING: 0
PHOTOPHOBIA: 0
COUGH: 0
CHOKING: 0
SHORTNESS OF BREATH: 0
VOICE CHANGE: 0
EYE PAIN: 0
NAUSEA: 0
ABDOMINAL DISTENTION: 0
SINUS PRESSURE: 0
SORE THROAT: 0
EYE REDNESS: 0
DIARRHEA: 0
RHINORRHEA: 0
ABDOMINAL PAIN: 1
VOMITING: 0
TROUBLE SWALLOWING: 0
EYE DISCHARGE: 0

## 2017-12-20 ASSESSMENT — PAIN SCALES - GENERAL
PAINLEVEL_OUTOF10: 6
PAINLEVEL_OUTOF10: 10
PAINLEVEL_OUTOF10: 0

## 2017-12-20 ASSESSMENT — PAIN DESCRIPTION - DESCRIPTORS: DESCRIPTORS: ACHING

## 2017-12-20 ASSESSMENT — PAIN DESCRIPTION - LOCATION: LOCATION: ABDOMEN

## 2017-12-20 ASSESSMENT — PAIN DESCRIPTION - FREQUENCY: FREQUENCY: CONTINUOUS

## 2017-12-20 ASSESSMENT — PAIN DESCRIPTION - PAIN TYPE: TYPE: ACUTE PAIN

## 2017-12-20 NOTE — ED PROVIDER NOTES
[facility]  eMERGENCY dEPARTMENT eNCOUnter          CHIEF COMPLAINT       Chief Complaint   Patient presents with    Hyperglycemia       Nurses Notes reviewed and I agree except as noted in the HPI. HISTORY OF PRESENT ILLNESS    Daylin Leal is a 23 y.o. female who presents For hyperglycemia nausea and vomiting. Apparently this is the third time in the last or months that she is presenting. She is a type I diabetic with a history of noncompliance and poorly controlled diabetes. She is coming in today complaining of abdominal cramping and nausea and vomiting. She states to me that she is taking her insulin. She does see Dr. Myles Ashley for endocrinology although I do not believe she has seen him recently. She was just recently admitted in August for the same thing. Currently the patient is resting on the cot, she is in no apparent distress. Patient is breathing rapidly at bedside, I can smell ketones. REVIEW OF SYSTEMS     Review of Systems   Constitutional: Negative for activity change, appetite change, diaphoresis, fatigue and unexpected weight change. HENT: Negative for congestion, ear discharge, ear pain, hearing loss, rhinorrhea, sinus pressure, sore throat, trouble swallowing and voice change. Eyes: Negative for photophobia, pain, discharge, redness and itching. Respiratory: Negative for cough, choking, chest tightness, shortness of breath and wheezing. Patient is Tachypniec     Cardiovascular: Negative for chest pain, palpitations and leg swelling. Gastrointestinal: Positive for abdominal pain (mild generalized abdominal pain). Negative for abdominal distention, blood in stool, constipation, diarrhea, nausea and vomiting. Endocrine: Negative for polydipsia, polyphagia and polyuria. Genitourinary: Negative for decreased urine volume, difficulty urinating, dysuria, enuresis, frequency, hematuria and urgency.    Musculoskeletal: Negative for arthralgias, back pain, gait problem, myalgias, neck pain and neck stiffness. Skin: Negative for pallor and rash. Allergic/Immunologic: Negative for immunocompromised state. Neurological: Negative for dizziness, tremors, seizures, syncope, facial asymmetry, weakness, light-headedness, numbness and headaches. Hematological: Negative for adenopathy. Does not bruise/bleed easily. Psychiatric/Behavioral: Negative for agitation, hallucinations and suicidal ideas. The patient is not nervous/anxious. PAST MEDICAL HISTORY    has a past medical history of Diabetes ([address] and Fibromyalgia. SURGICAL HISTORY      has no past surgical history on file. CURRENT MEDICATIONS       Previous Medications    BLOOD GLUCOSE CALIBRATION (TRUETRACK GLUCOSE CONTROL VI)    by In Vitro route daily    CYCLOBENZAPRINE (FLEXERIL) 5 MG TABLET    Take 0.25 tablets by mouth as needed    DULOXETINE (CYMBALTA) 30 MG EXTENDED RELEASE CAPSULE    Take 30 mg by mouth daily    FREESTYLE LANCETS MISC    1 each by Does not apply route 3 times daily    GLUCOSE BLOOD (BLOOD GLUCOSE TEST STRIPS) STRP    FreeStyle Freedom Lite Test Strips. Test blood sugars 3 times daily.     INSULIN GLARGINE (LANTUS) 100 UNIT/ML INJECTION VIAL    Inject 35 Units into the skin nightly    INSULIN LISPRO (HUMALOG) 100 UNIT/ML INJECTION VIAL    Inject 0-3 Units into the skin nightly    INSULIN LISPRO (HUMALOG) 100 UNIT/ML INJECTION VIAL    Inject 0-6 Units into the skin 3 times daily (with meals)    INSULIN LISPRO (HUMALOG) 100 UNIT/ML INJECTION VIAL    Inject 1-10 Units into the skin 3 times daily (with meals) Take 1 unit for every 10 carbs eaten in addition to sliding scale coverage    INSULIN PEN NEEDLE 30G X 8 MM MISC    1 each by Does not apply route 2 times daily    MAGNESIUM 30 MG TABLET    Take 30 mg by mouth 2 times daily    MULTIPLE VITAMINS-MINERALS (THERAPEUTIC MULTIVITAMIN-MINERALS) TABLET    Take 1 tablet by mouth daily    POTASSIUM CHLORIDE (KLOR-CON M) 20 MEQ tenderness, no tenderness at McBurney's point and negative Baird's sign. No hernia. Musculoskeletal: Normal range of motion. She exhibits no edema or tenderness. Lymphadenopathy:     She has no cervical adenopathy. Neurological: She is alert and oriented to person, place, and time. She has normal reflexes. No cranial nerve deficit. She exhibits normal muscle tone. Coordination normal.   Skin: Skin is warm and dry. No rash noted. She is not diaphoretic. Psychiatric: She has a normal mood and affect. Her behavior is normal. Judgment and thought content normal.   Nursing note and vitals reviewed. DIFFERENTIAL DIAGNOSIS:   Differential diagnosis discussed extensively a bedside with patient including but not limited to occasional noncompliance uncontrolled diabetes, brittle diabetes, diabetic ketoacidosis.     DIAGNOSTIC RESULTS     EKG: All EKG's are interpreted by the Emergency Department Physician who either signs or Co-signs this chart in the absence of a cardiologist.  None    RADIOLOGY: non-plain film images(s) such as CT, Ultrasound and MRI are read by the radiologist.  None    LABS:   Labs Reviewed   CBC WITH AUTO DIFFERENTIAL - Abnormal; Notable for the following:        Result Value    WBC 19.5 (*)     RBC 3.78 (*)     Hemoglobin 11.6 (*)     Hematocrit 35.8 (*)     MCHC 32.3 (*)     Segs Absolute 18.0 (*)     Lymphocytes # 0.7 (*)     All other components within normal limits   BASIC METABOLIC PANEL - Abnormal; Notable for the following:     Sodium 121 (*)     Potassium 5.9 (*)     Chloride 75 (*)     CO2 4 (*)     Glucose 969 (*)     BUN 44 (*)     All other components within normal limits   HEPATIC FUNCTION PANEL - Abnormal; Notable for the following:     Alkaline Phosphatase 170 (*)     All other components within normal limits   MAGNESIUM - Abnormal; Notable for the following:     Magnesium 2.6 (*)     All other components within normal limits   BLOOD GAS, ARTERIAL - Abnormal; Notable for the following:     pH, Blood Gas 7.16 (*)     PCO2 15 (*)     PO2 143 (*)     HCO3 5 (*)     Base Excess (Calculated) -21.5 (*)     All other components within normal limits   BETA-HYDROXYBUTYRATE - Abnormal; Notable for the following:     Beta-Hydroxybutyrate 134.30 (*)     All other components within normal limits   URINE WITH REFLEXED MICRO - Abnormal; Notable for the following:     Glucose, Ur >= 1000 (*)     Blood, Urine TRACE (*)     All other components within normal limits   GLOMERULAR FILTRATION RATE, ESTIMATED - Abnormal; Notable for the following:     Est, Glom Filt Rate 55 (*)     All other components within normal limits   ANION GAP - Abnormal; Notable for the following: Anion Gap 42.0 (*)     All other components within normal limits   OSMOLALITY - Abnormal; Notable for the following:     Osmolality Calc 303.6 (*)     All other components within normal limits   POCT GLUCOSE - Abnormal; Notable for the following:     POC Glucose >600 (*)     All other components within normal limits   POCT GLUCOSE - Abnormal; Notable for the following:     POC Glucose >600 (*)     All other components within normal limits   POCT GLUCOSE - Normal   LIPASE   HCG, SERUM, QUALITATIVE   TSH WITHOUT REFLEX   ETHANOL   URINE DRUG SCREEN   HEMOGLOBIN A1C   POCT GLUCOSE   POCT GLUCOSE   POCT GLUCOSE   POCT GLUCOSE       EMERGENCY DEPARTMENT COURSE:   Vitals:    Vitals:    12/20/17 0322 12/20/17 0351 12/20/17 0359 12/20/17 0413   BP: (!) 99/46 (!) 110/55     Pulse:   110    Resp: 16  18    Temp:       TempSrc:       SpO2: 100%  100%    Weight:       Height:    5' (1.524 m)     Patient was assessed at bedside appropriate labs were ordered. Patient had fluid bolus ordered. She definitely appears to be in diabetic ketoacidosis. I can smell ketones in the air. Her POCT glucose was greater than 600. Patient was awake alert and oriented. She was slightly tired but otherwise in no apparent distress.   Patient's ABG showed blood gases 7.16 with a PCO2 of 50 and a PO2 of 143 and bicarb 5 her base excess was -21.5. Patient does have a slight white blood cell count 19.5 however she had no fever. I do think that this is part of the DKA. Patient had low sodium at 121. Her CO2 on the BMP was for her glucose was 969 and her potassium was 5.9. Her beta hydroxybutyrate was 134.3. Hemoglobin A1c was also ordered. Patient had been ordered for continuous fluids. At this point I ordered the glucose stabilizing. I then called the hospitalist and discussed the case with him. He graciously accepted the admission. I suggested that the patient needed to go to the ICU.  2 IVs were in place. Patient remained stable throughout course. Patient is admitted in stable condition to the ICU for DKA. CRITICAL CARE:   45 minutes not including time for procedures    CONSULTS:  Hospitalist    PROCEDURES:  None    FINAL IMPRESSION      1. Diabetic ketoacidosis without coma associated with diabetes mellitus due to underlying condition ([facility] [address]    2. Hyponatremia    3. Metabolic acidosis    4. Hyperkalemia    5. Hypermagnesemia    6.  Dehydration          DISPOSITION/PLAN   Admission    PATIENT REFERRED TO:  Kamille Vogt, CNP  [address] [place] [address] [id number]            DISCHARGE MEDICATIONS:  New Prescriptions    No medications on file       (Please note that portions of this note were completed with a voice recognition program.  Efforts were made to edit the dictations but occasionally words are mis-transcribed.)    DO Issac Moreland DO  12/20/17 2559

## 2017-12-20 NOTE — ED NOTES
Pt resting in bed. VSS. Friend at bedside visiting. Call light within reach.      Veena Haley RN  12/20/17 [id number]

## 2017-12-20 NOTE — ED NOTES
Patient resting in bed. Respirations easy and unlabored. No distress noted. Call light within reach.         Brown Nicole RN  12/20/17 2241

## 2017-12-20 NOTE — CARE COORDINATION
Vaccination Screening Completed: yes, pt refused  Pneumonia Vaccination Screening Completed: n/a  Core measures: VTE  PCP: Jenna Basilio CNP  Readmission:   no  Risk Score:       Discharge Planning  Current Residence:     Living Arrangements:      Support Systems:     Current Services PTA:     Potential Assistance Needed:     Potential Assistance Purchasing Medications:     Does patient want to participate in local refill/ meds to beds program?     Type of Home Care Services:     Patient expects to be discharged to:     Expected Discharge date: Follow Up Appointment: Best Day/ Time:      Discharge Plan: Spoke with Beverly Bhatt; states she lives at home with a family member and did not use any DME PTA. She states she plans to return home at discharge and denies any needs.

## 2017-12-20 NOTE — CONSULTS
ketoacidosis. She is on Insulin drip and has an anion gap of 30. Not very talkative at this time. Review of Systems  Review of Systems - General ROS: negative   Psychological ROS: negative   Hematological and Lymphatic ROS: No history of blood clots or bleeding disorder. Respiratory ROS: no cough, shortness of breath, or wheezing   Cardiovascular ROS: no chest pain or dyspnea on exertion   Gastrointestinal ROS: no abdominal pain, change in bowel habits, or black or bloody stools   Genito-Urinary ROS: no dysuria, trouble voiding, or hematuria   Musculoskeletal ROS: negative   Neurological ROS: no TIA or stroke symptoms   Dermatological ROS: negative    Past Medical, Surgical, Family, Social and Allergy Histories:  I have reviewed the patient's medical history in detail and updated the computerized patient record. has a past medical history of Diabetes ([address] and Fibromyalgia. has no past surgical history on file. reports that she has quit smoking. Her smoking use included Cigarettes. She has never used smokeless tobacco. She reports that she does not drink alcohol or use drugs. family history includes High Cholesterol in her paternal grandmother.   No Known Allergies  Current Facility-Administered Medications   Medication Dose Route Frequency Provider Last Rate Last Dose    ondansetron (ZOFRAN) injection 4 mg  4 mg Intravenous Once Evangelista Tomas DO        0.9 % sodium chloride infusion   Intravenous Continuous Yi Olsen  mL/hr at 12/20/17 0753      DULoxetine (CYMBALTA) extended release capsule 30 mg  30 mg Oral Daily Yi Olsen MD        insulin regular (HUMULIN R;NOVOLIN R) 100 Units in sodium chloride 0.9 % 100 mL infusion  5 Units/hr Intravenous Continuous Yi Olsen MD 7.2 mL/hr at 12/20/17 0813 7.2 Units/hr at 12/20/17 0813    insulin regular (HUMULIN R;NOVOLIN R) injection 0-10 Units  0-10 Units Intravenous PRN Yi Olsen MD        dextrose 50 % solution 12.5 g Effective Now  Assessment:   Active Problems:    DKA, type 1, not at goal [facility])  Needs antibiotic Rocifen 1 gm IV daily     Diabetes Mellitus type 1, under poor control. Plan: \"Thank you for asking us to see this complex patient. \"

## 2017-12-20 NOTE — FLOWSHEET NOTE
Pt complains of nausea. Zofran 4 mg IV given then vomited approx 250 ml black emesis. Dr Shani Hughes made aware. Orders [id number] for protonix IV and GI consult.

## 2017-12-20 NOTE — ED TRIAGE NOTES
Pt presents to the ED via EMS with c/o hyperglycemia with N/V. EMS states the glucometer read \"high\" on their meter. Upon arrival to the ED the glucometer says the pt's blood sugar is \"high. \" Pt states she does not check her blood sugar regularly. Pt's fiance states they gave her 3 units of insulin at 0030.

## 2017-12-20 NOTE — H&P
No children. SOCIAL HISTORY:  She does not smoke. She does not drink. She does not use  any recreational drugs. ALLERGIES:  She has no known drug allergies. REVIEW OF SYSTEMS:  No headaches. She complains of blurry vision. She  denies any chest pain, shortness of breath, or cough. She complains of  fevers and chills. Complains of abdominal pain, nausea, and vomiting. No  diarrhea or constipation. No dysuria. Generalized weakness. All other  review of systems as per HPI. PHYSICAL EXAMINATION:  VITAL SIGNS:  Her heart rate is 111, blood pressure is 123/58, she is  sating at 100% on room air. She did have a period of hypotension 99/46  earlier. GENERAL:  The patient is lethargic, but arousable. She appears emaciated. HEENT:  Head is atraumatic. Her pupils are dilated, sluggishly reactive. Extraocular muscles are intact. Oropharynx shows poor dentition and dry  mucous membranes. NECK:  Supple. Normal range of motion. She does have bruits. CARDIOVASCULAR:  With a regular tachycardia. She does have a left lower  sternal 2/6 systolic murmur and flow murmurs in the upper sternal border. LUNGS:  Clear to auscultation. ABDOMEN:  Scaphoid with positive bowel sounds. Nondistended. No  organomegaly. She has tenderness to palpation in the abdomen without any  rebound. Otherwise, soft. No flank tenderness. EXTREMITIES:  Thready DP pulses. She has no edema. SKIN:  She has a very poor skin turgor. NEUROLOGICAL:  Her cranial nerves are intact. Her motor 5/5 x4. Normal  sensation. LABORATORY DATA:  Sodium 121, potassium 5.9, chloride 75, bicarb is only 4,  BUN is 44, creatinine is 1.2, anion gap is 42, magnesium 2.6, calcium 9.5,  albumin 4.5, alkaline phosphatase 170, ALT 18, AST 14, total bilirubin is  0.4, lipase is 40.5, beta hydroxybutyrate 134, HbA1c is 9.5, TSH is 2.6. Urine toxicology screen negative.   White blood cell count 19.5, hemoglobin  11.6, hematocrit 35.8, platelets 360.  Slight left shift. ASSESSMENT AND PLAN:  1. DKA in this patient with history of DKA, last discharged from this  facility on 10/20/2017. The patient was placed on a DKA protocol and fluid  resuscitation. Her bicarb was quite low and her corrected sodium is  approximately 140. We will continue aggressive fluid resuscitation, IV  Insulin. We will check her ketones  and her chemistry in the role of 4 hours, monitor the anion gap. 2.  GERD. The patient will be placed on GI prophylaxis. Further  recommendations pending the clinical course. 3.  I did discuss with the patient the risk of further noncompliance with  her insulin and this may result in death. So, we admitted her to the  intensive care unit due to the degree of her diabetic ketoacidosis. Jah oMss MD    D: 12/20/2017 4:59:51       T: 12/20/2017 5:46:57     [id number]  Job#: [id number]     Doc#: [id number]    CC:  Dani Ramsay M.D.            Casi Stevens, CNP

## 2017-12-20 NOTE — PLAN OF CARE
Problem: Falls - Risk of  Goal: Absence of falls  Outcome: Ongoing  No fall this shift. Pt is very lethargic. Up to bedside commode with assist. Very weak. Bed alarm. Frequent checks and hourly rounds to assess needs    Problem: Risk for Impaired Skin Integrity  Goal: Tissue integrity - skin and mucous membranes  Structural intactness and normal physiological function of skin and  mucous membranes. Outcome: Ongoing  Pt moves herself around in bed. Assist with repositioning as needed. Monitor for new skin breakdown. Problem: Discharge Planning:  Goal: Discharged to appropriate level of care  Discharged to appropriate level of care  Outcome: Ongoing  Plan discharge home when stable. Reassess needs daily    Problem: Fluid Volume - Imbalance:  Goal: Will remain free of signs and symptoms of dehydration  Will remain free of signs and symptoms of dehydration  Outcome: Ongoing  Monitor I and O and electrolytes. Replace as needed  Goal: Absence of imbalanced fluid volume signs and symptoms  Absence of imbalanced fluid volume signs and symptoms  Outcome: Ongoing      Problem: Serum Glucose Level - Abnormal:  Goal: Ability to maintain appropriate glucose levels will improve  Ability to maintain appropriate glucose levels will improve  Outcome: Ongoing  Glucose levels stabilized on insulin drip. Problem: Injury - Acid Base Imbalance:  Goal: Acid-base balance  Acid-base balance  Outcome: Ongoing  Acid base balance coming back to normal. MOnitor anion gap.  Continue insulin drip til anion gap WNL

## 2017-12-20 NOTE — ED NOTES
Bed: 011A  Expected date: 12/20/17  Expected time: 1:52 AM  Means of arrival: Lennis Curling  Comments:     Caitlyn Gee  12/20/17 9329

## 2017-12-21 ENCOUNTER — ANESTHESIA EVENT (OUTPATIENT)
Dept: ENDOSCOPY | Age: 22
DRG: 638 | End: 2017-12-21
Payer: COMMERCIAL

## 2017-12-21 ENCOUNTER — ANESTHESIA (OUTPATIENT)
Dept: ENDOSCOPY | Age: 22
DRG: 638 | End: 2017-12-21
Payer: COMMERCIAL

## 2017-12-21 VITALS — DIASTOLIC BLOOD PRESSURE: 96 MMHG | OXYGEN SATURATION: 95 % | SYSTOLIC BLOOD PRESSURE: 127 MMHG

## 2017-12-21 LAB
ANION GAP SERPL CALCULATED.3IONS-SCNC: 11 MEQ/L (ref 8–16)
BASOPHILS # BLD: 0.4 %
BASOPHILS ABSOLUTE: 0 THOU/MM3 (ref 0–0.1)
BUN BLDV-MCNC: 19 MG/DL (ref 7–22)
CALCIUM IONIZED: 1.07 MMOL/L (ref 1.12–1.32)
CALCIUM SERPL-MCNC: 7.6 MG/DL (ref 8.5–10.5)
CHLORIDE BLD-SCNC: 106 MEQ/L (ref 98–111)
CO2: 20 MEQ/L (ref 23–33)
CREAT SERPL-MCNC: 0.5 MG/DL (ref 0.4–1.2)
EOSINOPHIL # BLD: 0.1 %
EOSINOPHILS ABSOLUTE: 0 THOU/MM3 (ref 0–0.4)
FOLATE: NORMAL
GFR SERPL CREATININE-BSD FRML MDRD: > 90 ML/MIN/1.73M2
GLUCOSE BLD-MCNC: 101 MG/DL (ref 70–108)
GLUCOSE BLD-MCNC: 120 MG/DL (ref 70–108)
GLUCOSE BLD-MCNC: 128 MG/DL (ref 70–108)
GLUCOSE BLD-MCNC: 145 MG/DL (ref 70–108)
GLUCOSE BLD-MCNC: 166 MG/DL (ref 70–108)
GLUCOSE BLD-MCNC: 176 MG/DL (ref 70–108)
GLUCOSE BLD-MCNC: 296 MG/DL (ref 70–108)
GLUCOSE BLD-MCNC: 302 MG/DL (ref 70–108)
GLUCOSE BLD-MCNC: 343 MG/DL (ref 70–108)
GLUCOSE BLD-MCNC: 63 MG/DL (ref 70–108)
GLUCOSE BLD-MCNC: 81 MG/DL (ref 70–108)
HCT VFR BLD CALC: 23.5 % (ref 37–47)
HEMOGLOBIN: 8.3 GM/DL (ref 12–16)
INR BLD: 0.96 (ref 0.85–1.13)
LYMPHOCYTES # BLD: 13.3 %
LYMPHOCYTES ABSOLUTE: 1.5 THOU/MM3 (ref 1–4.8)
MCH RBC QN AUTO: 31.3 PG (ref 27–31)
MCHC RBC AUTO-ENTMCNC: 35.4 GM/DL (ref 33–37)
MCV RBC AUTO: 88.5 FL (ref 81–99)
MONOCYTES # BLD: 6.4 %
MONOCYTES ABSOLUTE: 0.7 THOU/MM3 (ref 0.4–1.3)
NUCLEATED RED BLOOD CELLS: 0 /100 WBC
PDW BLD-RTO: 13.4 % (ref 11.5–14.5)
PLATELET # BLD: 225 THOU/MM3 (ref 130–400)
PMV BLD AUTO: 7.8 MCM (ref 7.4–10.4)
POTASSIUM SERPL-SCNC: 2.8 MEQ/L (ref 3.5–5.2)
POTASSIUM SERPL-SCNC: 3.5 MEQ/L (ref 3.5–5.2)
RBC # BLD: 2.66 MILL/MM3 (ref 4.2–5.4)
SEG NEUTROPHILS: 79.8 %
SEGMENTED NEUTROPHILS ABSOLUTE COUNT: 8.9 THOU/MM3 (ref 1.8–7.7)
SODIUM BLD-SCNC: 137 MEQ/L (ref 135–145)
WBC # BLD: 11.1 THOU/MM3 (ref 4.8–10.8)

## 2017-12-21 PROCEDURE — C9113 INJ PANTOPRAZOLE SODIUM, VIA: HCPCS | Performed by: NURSE PRACTITIONER

## 2017-12-21 PROCEDURE — 85610 PROTHROMBIN TIME: CPT

## 2017-12-21 PROCEDURE — 2580000003 HC RX 258: Performed by: INTERNAL MEDICINE

## 2017-12-21 PROCEDURE — 6370000000 HC RX 637 (ALT 250 FOR IP): Performed by: INTERNAL MEDICINE

## 2017-12-21 PROCEDURE — 85025 COMPLETE CBC W/AUTO DIFF WBC: CPT

## 2017-12-21 PROCEDURE — 6370000000 HC RX 637 (ALT 250 FOR IP): Performed by: NURSE PRACTITIONER

## 2017-12-21 PROCEDURE — 80048 BASIC METABOLIC PNL TOTAL CA: CPT

## 2017-12-21 PROCEDURE — 2500000003 HC RX 250 WO HCPCS: Performed by: INTERNAL MEDICINE

## 2017-12-21 PROCEDURE — 84132 ASSAY OF SERUM POTASSIUM: CPT

## 2017-12-21 PROCEDURE — 3700000001 HC ADD 15 MINUTES (ANESTHESIA): Performed by: INTERNAL MEDICINE

## 2017-12-21 PROCEDURE — 82330 ASSAY OF CALCIUM: CPT

## 2017-12-21 PROCEDURE — 6360000002 HC RX W HCPCS: Performed by: NURSE ANESTHETIST, CERTIFIED REGISTERED

## 2017-12-21 PROCEDURE — 36415 COLL VENOUS BLD VENIPUNCTURE: CPT

## 2017-12-21 PROCEDURE — 2580000003 HC RX 258: Performed by: SURGERY

## 2017-12-21 PROCEDURE — 88305 TISSUE EXAM BY PATHOLOGIST: CPT

## 2017-12-21 PROCEDURE — 82948 REAGENT STRIP/BLOOD GLUCOSE: CPT

## 2017-12-21 PROCEDURE — 6370000000 HC RX 637 (ALT 250 FOR IP): Performed by: SURGERY

## 2017-12-21 PROCEDURE — 6360000002 HC RX W HCPCS: Performed by: NURSE PRACTITIONER

## 2017-12-21 PROCEDURE — 1200000003 HC TELEMETRY R&B

## 2017-12-21 PROCEDURE — 99999 PR OFFICE/OUTPT VISIT,PROCEDURE ONLY: CPT | Performed by: SURGERY

## 2017-12-21 PROCEDURE — 3609012400 HC EGD TRANSORAL BIOPSY SINGLE/MULTIPLE: Performed by: INTERNAL MEDICINE

## 2017-12-21 PROCEDURE — 6360000002 HC RX W HCPCS: Performed by: SURGERY

## 2017-12-21 PROCEDURE — 0DJ08ZZ INSPECTION OF UPPER INTESTINAL TRACT, VIA NATURAL OR ARTIFICIAL OPENING ENDOSCOPIC: ICD-10-PCS | Performed by: INTERNAL MEDICINE

## 2017-12-21 PROCEDURE — 3700000000 HC ANESTHESIA ATTENDED CARE: Performed by: INTERNAL MEDICINE

## 2017-12-21 PROCEDURE — 6360000002 HC RX W HCPCS: Performed by: INTERNAL MEDICINE

## 2017-12-21 PROCEDURE — A6209 FOAM DRSG <=16 SQ IN W/O BDR: HCPCS

## 2017-12-21 RX ORDER — PROPOFOL 10 MG/ML
INJECTION, EMULSION INTRAVENOUS PRN
Status: DISCONTINUED | OUTPATIENT
Start: 2017-12-21 | End: 2017-12-21 | Stop reason: SDUPTHER

## 2017-12-21 RX ORDER — 0.9 % SODIUM CHLORIDE 0.9 %
10 VIAL (ML) INJECTION PRN
Status: DISCONTINUED | OUTPATIENT
Start: 2017-12-21 | End: 2017-12-22 | Stop reason: HOSPADM

## 2017-12-21 RX ORDER — DEXTROSE MONOHYDRATE 25 G/50ML
12.5 INJECTION, SOLUTION INTRAVENOUS PRN
Status: DISCONTINUED | OUTPATIENT
Start: 2017-12-21 | End: 2017-12-22 | Stop reason: HOSPADM

## 2017-12-21 RX ORDER — 0.9 % SODIUM CHLORIDE 0.9 %
10 VIAL (ML) INJECTION EVERY 12 HOURS SCHEDULED
Status: DISCONTINUED | OUTPATIENT
Start: 2017-12-21 | End: 2017-12-21 | Stop reason: SDUPTHER

## 2017-12-21 RX ORDER — ACETAMINOPHEN 325 MG/1
650 TABLET ORAL EVERY 4 HOURS PRN
Status: DISCONTINUED | OUTPATIENT
Start: 2017-12-21 | End: 2017-12-22 | Stop reason: HOSPADM

## 2017-12-21 RX ORDER — SUCRALFATE ORAL 1 G/10ML
1 SUSPENSION ORAL
Status: DISCONTINUED | OUTPATIENT
Start: 2017-12-21 | End: 2017-12-22 | Stop reason: HOSPADM

## 2017-12-21 RX ORDER — SODIUM CHLORIDE 0.9 % (FLUSH) 0.9 %
10 SYRINGE (ML) INJECTION PRN
Status: DISCONTINUED | OUTPATIENT
Start: 2017-12-21 | End: 2017-12-22 | Stop reason: HOSPADM

## 2017-12-21 RX ORDER — INSULIN GLARGINE 100 [IU]/ML
30 INJECTION, SOLUTION SUBCUTANEOUS ONCE
Status: COMPLETED | OUTPATIENT
Start: 2017-12-21 | End: 2017-12-21

## 2017-12-21 RX ORDER — LIDOCAINE HYDROCHLORIDE 10 MG/ML
5 INJECTION, SOLUTION EPIDURAL; INFILTRATION; INTRACAUDAL; PERINEURAL ONCE
Status: DISCONTINUED | OUTPATIENT
Start: 2017-12-21 | End: 2017-12-22 | Stop reason: HOSPADM

## 2017-12-21 RX ORDER — POTASSIUM CHLORIDE 20 MEQ/1
40 TABLET, EXTENDED RELEASE ORAL ONCE
Status: COMPLETED | OUTPATIENT
Start: 2017-12-21 | End: 2017-12-21

## 2017-12-21 RX ORDER — INSULIN GLARGINE 100 [IU]/ML
35 INJECTION, SOLUTION SUBCUTANEOUS DAILY
Status: DISCONTINUED | OUTPATIENT
Start: 2017-12-22 | End: 2017-12-22 | Stop reason: HOSPADM

## 2017-12-21 RX ORDER — POTASSIUM CHLORIDE 20 MEQ/1
20 TABLET, EXTENDED RELEASE ORAL ONCE
Status: COMPLETED | OUTPATIENT
Start: 2017-12-21 | End: 2017-12-21

## 2017-12-21 RX ORDER — 0.9 % SODIUM CHLORIDE 0.9 %
10 VIAL (ML) INJECTION EVERY 12 HOURS SCHEDULED
Status: DISCONTINUED | OUTPATIENT
Start: 2017-12-21 | End: 2017-12-22 | Stop reason: HOSPADM

## 2017-12-21 RX ORDER — NICOTINE POLACRILEX 4 MG
15 LOZENGE BUCCAL PRN
Status: DISCONTINUED | OUTPATIENT
Start: 2017-12-21 | End: 2017-12-22 | Stop reason: HOSPADM

## 2017-12-21 RX ORDER — DEXTROSE MONOHYDRATE 50 MG/ML
100 INJECTION, SOLUTION INTRAVENOUS PRN
Status: DISCONTINUED | OUTPATIENT
Start: 2017-12-21 | End: 2017-12-21 | Stop reason: RX

## 2017-12-21 RX ORDER — DEXTROSE AND SODIUM CHLORIDE 5; .9 G/100ML; G/100ML
INJECTION, SOLUTION INTRAVENOUS PRN
Status: DISCONTINUED | OUTPATIENT
Start: 2017-12-21 | End: 2017-12-22 | Stop reason: HOSPADM

## 2017-12-21 RX ORDER — SODIUM CHLORIDE 0.9 % (FLUSH) 0.9 %
10 SYRINGE (ML) INJECTION EVERY 12 HOURS SCHEDULED
Status: DISCONTINUED | OUTPATIENT
Start: 2017-12-21 | End: 2017-12-22 | Stop reason: HOSPADM

## 2017-12-21 RX ORDER — PANTOPRAZOLE SODIUM 40 MG/1
40 TABLET, DELAYED RELEASE ORAL
Status: DISCONTINUED | OUTPATIENT
Start: 2017-12-21 | End: 2017-12-22 | Stop reason: HOSPADM

## 2017-12-21 RX ORDER — DEXTROSE MONOHYDRATE 50 MG/ML
INJECTION, SOLUTION INTRAVENOUS CONTINUOUS
Status: DISCONTINUED | OUTPATIENT
Start: 2017-12-21 | End: 2017-12-21

## 2017-12-21 RX ORDER — 0.9 % SODIUM CHLORIDE 0.9 %
10 VIAL (ML) INJECTION PRN
Status: DISCONTINUED | OUTPATIENT
Start: 2017-12-21 | End: 2017-12-21 | Stop reason: SDUPTHER

## 2017-12-21 RX ADMIN — ALUMINUM HYDROXIDE, MAGNESIUM HYDROXIDE, AND SIMETHICONE 30 ML: 200; 200; 20 SUSPENSION ORAL at 08:30

## 2017-12-21 RX ADMIN — DEXTROSE AND SODIUM CHLORIDE: 5; 450 INJECTION, SOLUTION INTRAVENOUS at 03:12

## 2017-12-21 RX ADMIN — ACETAMINOPHEN 650 MG: 325 TABLET ORAL at 21:42

## 2017-12-21 RX ADMIN — POTASSIUM CHLORIDE 40 MEQ: 1500 TABLET, EXTENDED RELEASE ORAL at 06:23

## 2017-12-21 RX ADMIN — SUCRALFATE 1 G: 1 SUSPENSION ORAL at 14:58

## 2017-12-21 RX ADMIN — DEXTROSE MONOHYDRATE 15 ML: 500 INJECTION PARENTERAL at 05:52

## 2017-12-21 RX ADMIN — DULOXETINE HYDROCHLORIDE 30 MG: 30 CAPSULE, DELAYED RELEASE ORAL at 21:36

## 2017-12-21 RX ADMIN — POTASSIUM CHLORIDE 40 MEQ: 1500 TABLET, EXTENDED RELEASE ORAL at 21:36

## 2017-12-21 RX ADMIN — INSULIN GLARGINE 30 UNITS: 100 INJECTION, SOLUTION SUBCUTANEOUS at 09:53

## 2017-12-21 RX ADMIN — DEXTROSE AND SODIUM CHLORIDE 100 ML: 5; 900 INJECTION, SOLUTION INTRAVENOUS at 13:31

## 2017-12-21 RX ADMIN — SODIUM PHOSPHATE, MONOBASIC, MONOHYDRATE AND SODIUM PHOSPHATE, DIBASIC, ANHYDROUS 20 MMOL: 276; 142 INJECTION, SOLUTION INTRAVENOUS at 00:12

## 2017-12-21 RX ADMIN — PROPOFOL 40 MG: 10 INJECTION, EMULSION INTRAVENOUS at 13:21

## 2017-12-21 RX ADMIN — Medication 10 ML: at 22:08

## 2017-12-21 RX ADMIN — CEFTRIAXONE SODIUM 1 G: 10 INJECTION, POWDER, FOR SOLUTION INTRAVENOUS at 13:56

## 2017-12-21 RX ADMIN — PANTOPRAZOLE SODIUM 40 MG: 40 TABLET, DELAYED RELEASE ORAL at 14:58

## 2017-12-21 RX ADMIN — ACETAMINOPHEN 650 MG: 325 TABLET ORAL at 04:33

## 2017-12-21 RX ADMIN — PROPOFOL 30 MG: 10 INJECTION, EMULSION INTRAVENOUS at 13:23

## 2017-12-21 RX ADMIN — Medication 2 UNITS: at 22:14

## 2017-12-21 RX ADMIN — POTASSIUM & SODIUM PHOSPHATES POWDER PACK 280-160-250 MG 250 MG: 280-160-250 PACK at 15:46

## 2017-12-21 RX ADMIN — INSULIN LISPRO 4 UNITS: 100 INJECTION, SOLUTION INTRAVENOUS; SUBCUTANEOUS at 15:46

## 2017-12-21 RX ADMIN — SUCRALFATE 1 G: 1 SUSPENSION ORAL at 21:36

## 2017-12-21 RX ADMIN — HYDROMORPHONE HYDROCHLORIDE 0.5 MG: 1 INJECTION, SOLUTION INTRAMUSCULAR; INTRAVENOUS; SUBCUTANEOUS at 18:47

## 2017-12-21 RX ADMIN — SODIUM CHLORIDE, PRESERVATIVE FREE 10 ML: 5 INJECTION INTRAVENOUS at 10:32

## 2017-12-21 RX ADMIN — ACETAMINOPHEN 650 MG: 325 TABLET ORAL at 15:01

## 2017-12-21 RX ADMIN — PANTOPRAZOLE SODIUM 40 MG: 40 INJECTION, POWDER, FOR SOLUTION INTRAVENOUS at 11:09

## 2017-12-21 RX ADMIN — POTASSIUM CHLORIDE 20 MEQ: 1500 TABLET, EXTENDED RELEASE ORAL at 10:20

## 2017-12-21 ASSESSMENT — PAIN DESCRIPTION - PAIN TYPE: TYPE: ACUTE PAIN

## 2017-12-21 ASSESSMENT — PAIN SCALES - GENERAL
PAINLEVEL_OUTOF10: 8
PAINLEVEL_OUTOF10: 7
PAINLEVEL_OUTOF10: 7
PAINLEVEL_OUTOF10: 3
PAINLEVEL_OUTOF10: 3
PAINLEVEL_OUTOF10: 2
PAINLEVEL_OUTOF10: 6
PAINLEVEL_OUTOF10: 3
PAINLEVEL_OUTOF10: 3

## 2017-12-21 ASSESSMENT — PAIN DESCRIPTION - LOCATION: LOCATION: HEAD

## 2017-12-21 NOTE — FLOWSHEET NOTE
0800 Mrs Roosevelt Tony rests in the bed. She is asking not to have the PICC placed today. She is OK with the EGD. She remains NPO for planned EGD at 1300 today. Her IV is clear. The insulin gtt has stopped. She denies pain. 0900 She has complaints of heart burn and was given maalox for this.

## 2017-12-21 NOTE — PROGRESS NOTES
Picc line on hold per Janet Casey. Potassium level within normal range at this time.   Floor to call if picc line becomes necessary due to electrolyte replacement

## 2017-12-21 NOTE — PROGRESS NOTES
Intensive Care Unit  Medical Intensive Care Unit  Attending Progress Note      Patient was seen and evaluated with Dr. Magnus Schultz. Additions/deletions/highlights to Dr. Judah Dougherty history and data based as presented on rounds:    HPI. 22F with a history of DMT1, generally noncompliant with her insulin, admitted 17 with DKA. Reported a 1-2 day history of mid abdominal pain associated with nausea and vomiting, associated with fevers and chills. Presented to the ER with a temperature of 36.6F, , /55, RR 18 ( POx.100% on RA). Labs: Na 121, K+ 5.9, , AG 42 and .3. U/A neg. ABG  pH 7.16, pCO2 of 15, pO2 143. Started on insulin drip and crystalloid fluid resuscitation. Transferred to the ICU for critical care management. Comorbidities: DMT1, GERD, Fibromyalgia. Had a dark fluid emesis yesterday, and GI consulted and plan to do EGD. 17: Scheduled for EGD today. Insulin drip off, but hypokalemic. No abdominal pain today. NPO, except K replacement. Team members present on rounds:  Nursing, Nutritionist, Pharm D, Respiratory Therapist and Social Work    ICU guidelines/prophylaxis active for the following:    head above bed above 30 degrees, insulin guidelines, DVT prophylaxis, ulcer prophylaxis and analgesia/sedation guidelines    Patient Examined?   yes    Additions/differences/highlights to the resident's/fellow's exam:  VITALS:  /87   Pulse 110   Temp 98.2 °F (36.8 °C) (Oral)   Resp 20   Ht 5' (1.524 m)   Wt 90 lb 13.3 oz (41.2 kg)   SpO2 96%   BMI 17.74 kg/m²   CURRENT TEMPERATURE:  Temp: 98.2 °F (36.8 °C)  MAXIMUM TEMPERATURE OVER 24HRS:  Temp (24hrs), Av.7 °F (37.1 °C), Min:98.2 °F (36.8 °C), Max:99.2 °F (37.3 °C)    CURRENT RESPIRATORY RATE:  Resp: 20  CURRENT PULSE:  Pulse: 110  CURRENT BLOOD PRESSURE:  BP: 130/87  CURRENT PULSE OXIMETRY:  SpO2: 96 %  24HR INTAKE/OUTPUT:    Intake/Output Summary (Last 24 hours) at 17 1228  Last data filed at 17 Value Date    PHOS 1.4 12/20/2017         KEY ISSUES/FINDINGS/ASSESSMENT AND PLAN:      ASSESSMENT AND PLAN:    1. DKA (resolved) without Coma  2. Abdominal pain with dark emesis r/o UGI bleeding  3. Anemia  4. Hypokalemia  5. Comorbidities:       DMT1       Fibromyalgia       GERD      PLAN:    ICU Monitoring    NEURO:  Intact  Neuro checks  Pain control prn    RESP:  Stable  Pulmonary toilet with duonebs prn    CV:  Stable  Monitor Hgb    JERRICA:  Correct Hypokalemia: PO  Monitor renal function and electrolytes    GI:  EGD today  NPO  PPI: Protonix    ENDO:  Glycemic control with ISS and Lantus    ID:   On Rocephin (Empiric): so far cultures neg  Monitor temp and WBC  Follow cultures    DVT:  SCDs  Chemoprophylaxis when GI Bleed rule out. Spent 25 minutes critical care time excluding procedures and teaching.

## 2017-12-21 NOTE — OP NOTE
Oriented  []Other:      VITAL SIGNS   Patient Vitals for the past 24 hrs:   BP Temp Temp src Pulse Resp SpO2 Weight   12/21/17 1200 124/82 - - - - - -   12/21/17 1100 130/87 98.2 °F (36.8 °C) Oral 110 20 96 % -   12/21/17 1000 123/83 - - 111 13 98 % -   12/21/17 0900 (!) 127/91 - - 107 11 97 % -   12/21/17 0801 123/79 98.3 °F (36.8 °C) Oral 110 22 96 % -   12/21/17 0700 130/86 - - 109 27 - -   12/21/17 0600 121/70 - - 105 13 - -   12/21/17 0502 120/82 - - 105 19 - -   12/21/17 0406 115/70 99.2 °F (37.3 °C) Oral 104 (!) 31 - -   12/21/17 0400 - - - - - - 90 lb 13.3 oz (41.2 kg)   12/21/17 0201 108/66 - - 108 9 - -   12/21/17 0102 (!) 104/58 - - 112 21 - -   12/21/17 0002 118/74 99.1 °F (37.3 °C) Oral 111 19 - -   12/20/17 2302 113/70 - - 111 19 - -   12/20/17 2200 115/70 - - 110 16 - -   12/20/17 2102 114/64 - - 109 15 - -   12/20/17 2000 115/77 98.7 °F (37.1 °C) Oral 112 15 97 % -   12/20/17 1902 113/77 - - 110 (!) 32 99 % -   12/20/17 1810 (!) 96/55 - - 108 23 97 % -   12/20/17 1602 100/67 98.6 °F (37 °C) Oral 108 12 98 % -   12/20/17 1502 106/64 - - 109 15 97 % -   12/20/17 1402 118/74 - - 117 20 98 % -       PLANNED PROCEDURE   [x]EGD  []Colonoscopy []Flex Sigmoid  []ERCP []EUS   []Cystoscopy  [] CATH [] BRONCH   Consent: I have discussed with the patient and/or the patient representative the indication, alternatives, and the possible risks and/or complications of the planned procedure and the anesthesia methods. The patient and/or patient representative appear to understand and agree to proceed.   SEDATION  Planned agent:[x]Midazolam []Meperidine [x]Sublimaze []Morphine  []Diazepam  []Other:     ASA Classification: Class 3 - A patient with severe systemic disease that limits activity but is not incapacitating    Airway Assessment: Mallampati Class II - (soft palate, fauces & uvula are visible)    Monitoring and Safety: The patient will be placed on a cardiac monitor and vital signs, pulse oximetry and level of consciousness will be continuously evaluated throughout the procedure. The patient will be closely monitored until recovery from the medications is complete and the patient has returned to baseline status. Respiratory therapy will be on standby during the procedure. [x]Pre-procedure diagnostic studies complete and results available. Comment:    [x]Previous sedation/anesthesia experiences assessed. Comment:    [x]The patient is an appropriate candidate to undergo the planned procedure sedation and anesthesia. (Refer to nursing sedation/analgesia documentation record)  [x]Formulation and discussion of sedation/procedure plan, risks, and expectations with patient and/or responsible adult completed. [x]Patient examined immediately prior to the procedure.  (Refer to nursing sedation/analgesia documentation record)    Bhavya Borjas MD   Electronically signed 12/21/2017 at 1:13 PM

## 2017-12-21 NOTE — ANESTHESIA PRE PROCEDURE
MD        magnesium sulfate 1 g in dextrose 5 % 100 mL IVPB  1 g Intravenous PRN Maria Ines Belcher MD        sodium phosphate 10 mmol in dextrose 5 % 250 mL IVPB  10 mmol Intravenous PRN Maria Ines Belcher MD        Or    sodium phosphate 15 mmol in dextrose 5 % 250 mL IVPB  15 mmol Intravenous PRN Maria Ines Belcher MD        Or    sodium phosphate 20 mmol in dextrose 5 % 500 mL IVPB  20 mmol Intravenous PRN Maria Ines Belcher MD        0.9 % sodium chloride bolus  15 mL/kg Intravenous Once Maria Ines Belcher MD        Followed by   [facility] 0.9 % sodium chloride infusion   Intravenous Continuous Maria Ines Belcher  mL/hr at 12/20/17 0928      dextrose 5 % and 0.45 % sodium chloride infusion   Intravenous Continuous PRN Maria Ines Belcher  mL/hr at 12/21/17 0312      pantoprazole (PROTONIX) injection 40 mg  40 mg Intravenous Q8H Donald Cevallos, NP   40 mg at 12/21/17 1109    cefTRIAXone (ROCEPHIN) 1 g in sterile water 10 mL IV syringe  1 g Intravenous Q24H Vidhi Cheung MD   1 g at 12/20/17 1429    HYDROmorphone (DILAUDID) injection 0.5 mg  0.5 mg Intravenous Q3H PRN Carlos Limon MD   0.5 mg at 12/20/17 1445    aluminum & magnesium hydroxide-simethicone (MAALOX) 200-200-20 MG/5ML suspension 30 mL  30 mL Oral Q6H PRN Froilan Oglesby MD   30 mL at 12/21/17 0830       Allergies:  No Known Allergies    Problem List:    Patient Active Problem List   Diagnosis Code    Weight loss, unintentional R63.4    History of gastroesophageal reflux (GERD) Z87.19    Fibromyalgia M79.7    Underweight R63.6    DKA, type 1, not at goal [facility]) E10.10       Past Medical History:        Diagnosis Date    Diabetes ([address]     Fibromyalgia 10/27/2017       Past Surgical History:  History reviewed. No pertinent surgical history.     Social History:    Social History   Substance Use Topics    Smoking status: Former Smoker     Types: Cigarettes    Smokeless tobacco: Never Used    Alcohol use No Counseling given: Not Answered      Vital Signs (Current):   Vitals:    12/21/17 0900 12/21/17 1000 12/21/17 1100 12/21/17 1200   BP: (!) 127/91 123/83 130/87 124/82   Pulse: 107 111 110    Resp: 11 13 20    Temp:   36.8 °C (98.2 °F)    TempSrc:   Oral    SpO2: 97% 98% 96%    Weight:       Height:                                                  BP Readings from Last 3 Encounters:   12/21/17 124/82   10/28/17 129/88   08/05/17 (!) 130/93       NPO Status: Time of last liquid consumption: 2100                        Time of last solid consumption: 2100                        Date of last liquid consumption: 12/20/17                        Date of last solid food consumption: 12/19/17    BMI:   Wt Readings from Last 3 Encounters:   12/21/17 90 lb 13.3 oz (41.2 kg)   10/28/17 85 lb 8 oz (38.8 kg)   08/01/17 90 lb 3.2 oz (40.9 kg)     Body mass index is 17.74 kg/m².     CBC:   Lab Results   Component Value Date    WBC 11.1 12/21/2017    RBC 2.66 12/21/2017    HGB 8.3 12/21/2017    HCT 23.5 12/21/2017    MCV 88.5 12/21/2017    RDW 13.4 12/21/2017     12/21/2017       CMP:   Lab Results   Component Value Date     12/21/2017    K 3.5 12/21/2017     12/21/2017    CO2 20 12/21/2017    BUN 19 12/21/2017    CREATININE 0.5 12/21/2017    LABGLOM >90 12/21/2017    GLUCOSE 120 12/21/2017    PROT 7.5 12/20/2017    CALCIUM 7.6 12/21/2017    BILITOT 0.4 12/20/2017    ALKPHOS 170 12/20/2017    AST 14 12/20/2017    ALT 18 12/20/2017       POC Tests:   Recent Labs      12/21/17   0618   POCGLU  101       Coags:   Lab Results   Component Value Date    INR 0.96 12/21/2017       HCG (If Applicable):   Lab Results   Component Value Date    PREGTESTUR NEGATIVE 08/01/2017    PREGSERUM NEGATIVE 12/20/2017        ABGs: No results found for: PHART, PO2ART, PUW6DVL, BMS7NOL, BEART, L7TYXGQH     Type & Screen (If Applicable):  No results found for: LABABO, [address]    Anesthesia Evaluation  Patient summary reviewed and Nursing notes reviewed  Airway: Mallampati: II  TM distance: >3 FB   Neck ROM: full  Mouth opening: > = 3 FB Dental:      Comment: Denies any loose/removable    Pulmonary:Negative Pulmonary ROS breath sounds clear to auscultation                             Cardiovascular:  Exercise tolerance: good (>4 METS),           Rhythm: regular  Rate: normal           Beta Blocker:  Not on Beta Blocker         Neuro/Psych:   Negative Neuro/Psych ROS              GI/Hepatic/Renal:            ROS comment: Last vomited yesterday. Endo/Other:    (+) Type I DM, , .                 Abdominal:           Vascular: negative vascular ROS. Anesthesia Plan      MAC     ASA 2       Induction: intravenous. Anesthetic plan and risks discussed with patient. Plan discussed with attending.                   Michelle Rowe CRNA   12/21/2017

## 2017-12-21 NOTE — PROGRESS NOTES
EGD completed in patient's ICU room with Anesthesia support. Biopsy taken. One jar sent/labeled and sent to lab. Tolerated well. Pictures taken.

## 2017-12-21 NOTE — PROGRESS NOTES
smoking. Her smoking use included Cigarettes. She has never used smokeless tobacco. She reports that she does not drink alcohol or use drugs. family history includes High Cholesterol in her paternal grandmother.   No Known Allergies  Current Facility-Administered Medications   Medication Dose Route Frequency Provider Last Rate Last Dose    acetaminophen (TYLENOL) tablet 650 mg  650 mg Oral Q4H PRN Erica Hutchins CNP   650 mg at 12/21/17 0433    DULoxetine (CYMBALTA) extended release capsule 30 mg  30 mg Oral Daily Tera Georges MD        insulin regular (HUMULIN R;NOVOLIN R) 100 Units in sodium chloride 0.9 % 100 mL infusion  5 Units/hr Intravenous Continuous Tera Georges MD   Stopped at 12/21/17 0552    insulin regular (HUMULIN R;NOVOLIN R) injection 0-10 Units  0-10 Units Intravenous PRN Tera Georges MD        dextrose 50 % solution 12.5 g  12.5 g Intravenous PRN Tera Georges MD   15 mL at 12/21/17 0552    potassium chloride 10 mEq/100 mL IVPB (Peripheral Line)  10 mEq Intravenous PRN Tera Georges MD        magnesium sulfate 1 g in dextrose 5 % 100 mL IVPB  1 g Intravenous PRN Tera Georges MD        sodium phosphate 10 mmol in dextrose 5 % 250 mL IVPB  10 mmol Intravenous PRN Tera Georges MD        Or    sodium phosphate 15 mmol in dextrose 5 % 250 mL IVPB  15 mmol Intravenous PRN Tera Georges MD        Or    sodium phosphate 20 mmol in dextrose 5 % 500 mL IVPB  20 mmol Intravenous PRN Tera Georges MD        0.9 % sodium chloride bolus  15 mL/kg Intravenous Once Tera Georges MD        Followed by   [facility] 0.9 % sodium chloride infusion   Intravenous Continuous Tera Georges  mL/hr at 12/20/17 0928      dextrose 5 % and 0.45 % sodium chloride infusion   Intravenous Continuous PRN Tera Georges  mL/hr at 12/21/17 0312      pantoprazole (PROTONIX) injection 40 mg  40 mg Intravenous Q8H Sam Hanks NP   40 mg at 12/20/17 2014    cefTRIAXone (ROCEPHIN) 1 g in sterile water 10 mL IV syringe  1 g Intravenous Q24H Lise Leroy MD   1 g at 12/20/17 1429    HYDROmorphone (DILAUDID) injection 0.5 mg  0.5 mg Intravenous Q3H PRN Sam Castillo MD   0.5 mg at 12/20/17 1445    aluminum & magnesium hydroxide-simethicone (MAALOX) 200-200-20 MG/5ML suspension 30 mL  30 mL Oral Q6H PRN Rebeca Su MD   30 mL at 12/20/17 2327     Home Meds: [unfilled]  Psychiatric hospital Meds:   DULoxetine  30 mg Oral Daily    sodium chloride  15 mL/kg Intravenous Once    pantoprazole  40 mg Intravenous Q8H    cefTRIAXone (ROCEPHIN) IV  1 g Intravenous Q24H     Continuous Infusions:   insulin (HUMAN R) non-weight based infusion Stopped (12/21/17 0552)    sodium chloride 250 mL/hr at 12/20/17 0928    dextrose 5 % and 0.45 % NaCl 150 mL/hr at 12/21/17 0312     PRN Meds:acetaminophen, insulin regular, dextrose, potassium chloride, magnesium sulfate, sodium phosphate IVPB **OR** sodium phosphate IVPB **OR** sodium phosphate IVPB, dextrose 5 % and 0.45 % NaCl, HYDROmorphone, aluminum & magnesium hydroxide-simethicone    OBJECTIVE        Physical    VITALS:  /70   Pulse 105   Temp 99.2 °F (37.3 °C) (Oral)   Resp 13   Ht 5' (1.524 m)   Wt 85 lb 8.6 oz (38.8 kg)   SpO2 97%   BMI 16.71 kg/m²   CONSTITUTIONAL:  awake, alert, cooperative, no apparent distress, and appears stated age  LUNGS:  No increased work of breathing, good air exchange, clear to auscultation bilaterally, no crackles or wheezing  CARDIOVASCULAR:  Normal apical impulse, regular rate and rhythm, normal S1 and S2, no S3 or S4, and no murmur noted  ABDOMEN:  No scars, normal bowel sounds, soft, non-distended, non-tender, no masses palpated, no hepatosplenomegally    DATA  TSH:    Lab Results   Component Value Date    TSH 2.610 12/20/2017   No components found for: FREE T4No components found for: FREET3  RADIOLOGYREPORTS:    Lab Review    Lab Results   Component Value Date    TSH 2.610 12/20/2017    T6WUOUO 72 10/27/2017     No results found for: FREET4  No results found for: TESTOSTERONE  CBC:  Lab Results   Component Value Date    WBC 11.1 12/21/2017    RBC 2.66 12/21/2017    HGB 8.3 12/21/2017    HCT 23.5 12/21/2017    MCV 88.5 12/21/2017    MCH 31.3 12/21/2017    MCHC 35.4 12/21/2017    RDW 13.4 12/21/2017     12/21/2017    MPV 7.8 12/21/2017     CMP:    Lab Results   Component Value Date     12/21/2017    K 2.8 12/21/2017     12/21/2017    CO2 20 12/21/2017    BUN 19 12/21/2017    CREATININE 0.5 12/21/2017    LABGLOM >90 12/21/2017    GLUCOSE 120 12/21/2017    PROT 7.5 12/20/2017    LABALBU 4.5 12/20/2017    CALCIUM 7.6 12/21/2017    BILITOT 0.4 12/20/2017    ALKPHOS 170 12/20/2017    AST 14 12/20/2017    ALT 18 12/20/2017     Hepatic Function Panel:    Lab Results   Component Value Date    ALKPHOS 170 12/20/2017    ALT 18 12/20/2017    AST 14 12/20/2017    PROT 7.5 12/20/2017    BILITOT 0.4 12/20/2017    BILIDIR <0.2 12/20/2017    LABALBU 4.5 12/20/2017     Magnesium:    Lab Results   Component Value Date    MG 1.7 12/20/2017     PT/INR:    Lab Results   Component Value Date    INR 0.96 12/21/2017     HgBA1c:    Lab Results   Component Value Date    LABA1C 9.5 12/20/2017      No results for input(s): CKTOTAL, CKMB, CKMBINDEX, TROPONINI in the last 72 hours.   FLP:  No results found for: TRIG, HDL, LDLCALC, LDLDIRECT, LABVLDL  DIET: Diet NPO Effective Now  Impression:    BG down, K+ is low, Co2 20 L    ASSESSMENT AND PLAN

## 2017-12-21 NOTE — PROGRESS NOTES
Nutrition Assessment    Type and Reason for Visit: Initial, Consult (malnutrition evaluation)    Nutrition Recommendations: Begin diet as able; pt. Follows 1:15 cho insulin ratio pta    Malnutrition Assessment:  · Malnutrition Status: At risk for malnutrition    Nutrition Diagnosis:   · Problem: Altered nutrition-related lab values  · Etiology: related to Endocrine dysfunction     Signs and symptoms:  as evidenced by Lab values (A1c 9.5%)    Nutrition Assessment:  · Subjective Assessment: Pt. seen; poor po the past 3-4 days pta otherwise eats \"great\" and weight is up per pt. UBW is only 85#; admit with DKA; EGD done today as had black emesis but only found  Esophageal ulcers and gastritis; pt. states she was taking insulin on a 1:15 cho/insulin ratio pta ; A1c 9.5%; pt. follows with endocrinologist and RD in the office in [facility] ERTL per pt.; BM x1 12/20; glucose in ED was 969; pt. denies DB diet questions - encouraged compliance with DB Tx   · Wound Type: None  · Current Nutrition Therapies:  · Oral Diet Orders: NPO   · Anthropometric Measures:  · Ht: 5' (152.4 cm)   · Current Body Wt: 90 lb (40.8 kg) (12/21 with no edema)  · Admission Body Wt: 75 lb (34 kg) (stated however )  · Usual Body Wt: 85 lb (38.6 kg) (per pt.; 7/17 83# per EMR)  · Ideal Body Wt: 100 lb (45.4 kg), % Ideal Body 90%  · BMI Classification: BMI <18.5 Underweight (17.8)  · Comparative Standards (Estimated Nutrition Needs):  · Estimated Daily Total Kcal: 1575  · Estimated Daily Protein (g): 54-59 grams    Estimated Intake vs Estimated Needs: Intake Less Than Needs    Nutrition Risk Level:  Moderate    Nutrition Interventions:   Start oral diet (when ok with MD; pt. declines ONS)  Continued Inpatient Monitoring, Education Not Indicated (pt. familiar with CHO counting and denies questions; encouraged compliance. )    Nutrition Evaluation:   · Evaluation: Goals set   · Goals: adequate nutrient intake in 1-4 days    · Monitoring: NPO Status, Diet Progression, Weight, Pertinent Labs    See Adult Nutrition Doc Flowsheet for more detail.      Electronically signed by Terrie Francis RD, WOODY on 12/21/17 at 2:49 PM    Contact Number: [phone]

## 2017-12-21 NOTE — FLOWSHEET NOTE
Pt was alone at the time of the visit. She was in a good spirit and wanted prayer to heal and I prayed asking God to heal her. 12/21/17 1135   Encounter Summary   Services provided to: Patient   Referral/Consult From: Rounding   Place of [address] [place] [address] Avenue Visiting Yes  (12/21 )   Complexity of Encounter Moderate   Length of Encounter 30 minutes   Routine   Type Follow up   Spiritual/Temple   Type Spiritual support   Assessment Approachable;Calm; Hopeful   Intervention Prayer;Nurtured hope; Active listening;Empowerment;Sustaining presence/ Ministry of presence   Outcome Connection/belonging;Expressed gratitude;Encouraged; Hopeful;Receptive

## 2017-12-22 VITALS
HEIGHT: 60 IN | TEMPERATURE: 97.9 F | WEIGHT: 92 LBS | BODY MASS INDEX: 18.06 KG/M2 | RESPIRATION RATE: 16 BRPM | HEART RATE: 102 BPM | OXYGEN SATURATION: 98 % | SYSTOLIC BLOOD PRESSURE: 139 MMHG | DIASTOLIC BLOOD PRESSURE: 97 MMHG

## 2017-12-22 LAB
ANION GAP SERPL CALCULATED.3IONS-SCNC: 10 MEQ/L (ref 8–16)
BUN BLDV-MCNC: 9 MG/DL (ref 7–22)
CALCIUM SERPL-MCNC: 8.4 MG/DL (ref 8.5–10.5)
CHLORIDE BLD-SCNC: 106 MEQ/L (ref 98–111)
CO2: 25 MEQ/L (ref 23–33)
CREAT SERPL-MCNC: 0.4 MG/DL (ref 0.4–1.2)
GFR SERPL CREATININE-BSD FRML MDRD: > 90 ML/MIN/1.73M2
GLUCOSE BLD-MCNC: 123 MG/DL (ref 70–108)
GLUCOSE BLD-MCNC: 163 MG/DL (ref 70–108)
GLUCOSE BLD-MCNC: 185 MG/DL (ref 70–108)
MRSA SCREEN: NORMAL
POTASSIUM SERPL-SCNC: 3.8 MEQ/L (ref 3.5–5.2)
PROCALCITONIN: 0.22 NG/ML (ref 0.01–0.09)
SODIUM BLD-SCNC: 141 MEQ/L (ref 135–145)
VRE CULTURE: NORMAL

## 2017-12-22 PROCEDURE — 84145 PROCALCITONIN (PCT): CPT

## 2017-12-22 PROCEDURE — 6370000000 HC RX 637 (ALT 250 FOR IP): Performed by: INTERNAL MEDICINE

## 2017-12-22 PROCEDURE — 82948 REAGENT STRIP/BLOOD GLUCOSE: CPT

## 2017-12-22 PROCEDURE — 36415 COLL VENOUS BLD VENIPUNCTURE: CPT

## 2017-12-22 PROCEDURE — 99239 HOSP IP/OBS DSCHRG MGMT >30: CPT | Performed by: INTERNAL MEDICINE

## 2017-12-22 PROCEDURE — 6360000002 HC RX W HCPCS: Performed by: SURGERY

## 2017-12-22 PROCEDURE — 2580000003 HC RX 258: Performed by: INTERNAL MEDICINE

## 2017-12-22 PROCEDURE — 80048 BASIC METABOLIC PNL TOTAL CA: CPT

## 2017-12-22 PROCEDURE — 99291 CRITICAL CARE FIRST HOUR: CPT | Performed by: INTERNAL MEDICINE

## 2017-12-22 RX ORDER — SUCRALFATE ORAL 1 G/10ML
1 SUSPENSION ORAL
Qty: 1200 ML | Refills: 0 | Status: SHIPPED | OUTPATIENT
Start: 2017-12-22

## 2017-12-22 RX ORDER — PANTOPRAZOLE SODIUM 40 MG/1
40 TABLET, DELAYED RELEASE ORAL
Qty: 60 TABLET | Refills: 0 | Status: SHIPPED | OUTPATIENT
Start: 2017-12-22

## 2017-12-22 RX ORDER — CYCLOBENZAPRINE HCL 10 MG
5 TABLET ORAL 3 TIMES DAILY PRN
Status: DISCONTINUED | OUTPATIENT
Start: 2017-12-22 | End: 2017-12-22 | Stop reason: HOSPADM

## 2017-12-22 RX ORDER — ONDANSETRON 2 MG/ML
4 INJECTION INTRAMUSCULAR; INTRAVENOUS EVERY 6 HOURS PRN
Status: DISCONTINUED | OUTPATIENT
Start: 2017-12-22 | End: 2017-12-22 | Stop reason: HOSPADM

## 2017-12-22 RX ORDER — LIDOCAINE 50 MG/G
2 PATCH TOPICAL DAILY
Qty: 30 PATCH | Refills: 0 | Status: SHIPPED | OUTPATIENT
Start: 2017-12-22

## 2017-12-22 RX ORDER — CYCLOBENZAPRINE HCL 5 MG
5 TABLET ORAL 3 TIMES DAILY PRN
Qty: 30 TABLET | Refills: 0 | Status: SHIPPED | OUTPATIENT
Start: 2017-12-22

## 2017-12-22 RX ORDER — LIDOCAINE 50 MG/G
2 PATCH TOPICAL DAILY
Status: DISCONTINUED | OUTPATIENT
Start: 2017-12-22 | End: 2017-12-22 | Stop reason: HOSPADM

## 2017-12-22 RX ADMIN — INSULIN GLARGINE 35 UNITS: 100 INJECTION, SOLUTION SUBCUTANEOUS at 09:16

## 2017-12-22 RX ADMIN — PANTOPRAZOLE SODIUM 40 MG: 40 TABLET, DELAYED RELEASE ORAL at 06:34

## 2017-12-22 RX ADMIN — SUCRALFATE 1 G: 1 SUSPENSION ORAL at 06:34

## 2017-12-22 RX ADMIN — HYDROMORPHONE HYDROCHLORIDE 1 MG: 1 INJECTION, SOLUTION INTRAMUSCULAR; INTRAVENOUS; SUBCUTANEOUS at 02:27

## 2017-12-22 RX ADMIN — POTASSIUM & SODIUM PHOSPHATES POWDER PACK 280-160-250 MG 250 MG: 280-160-250 PACK at 12:43

## 2017-12-22 RX ADMIN — DULOXETINE HYDROCHLORIDE 30 MG: 30 CAPSULE, DELAYED RELEASE ORAL at 09:14

## 2017-12-22 RX ADMIN — INSULIN LISPRO 1 UNITS: 100 INJECTION, SOLUTION INTRAVENOUS; SUBCUTANEOUS at 09:16

## 2017-12-22 RX ADMIN — POTASSIUM & SODIUM PHOSPHATES POWDER PACK 280-160-250 MG 250 MG: 280-160-250 PACK at 09:15

## 2017-12-22 RX ADMIN — SUCRALFATE 1 G: 1 SUSPENSION ORAL at 09:14

## 2017-12-22 RX ADMIN — Medication 10 ML: at 09:15

## 2017-12-22 ASSESSMENT — PAIN DESCRIPTION - ONSET: ONSET: ON-GOING

## 2017-12-22 ASSESSMENT — PAIN SCALES - GENERAL
PAINLEVEL_OUTOF10: 4
PAINLEVEL_OUTOF10: 8
PAINLEVEL_OUTOF10: 0
PAINLEVEL_OUTOF10: 0

## 2017-12-22 ASSESSMENT — PAIN DESCRIPTION - DESCRIPTORS: DESCRIPTORS: BURNING

## 2017-12-22 ASSESSMENT — PAIN DESCRIPTION - DIRECTION: RADIATING_TOWARDS: TAILBONE

## 2017-12-22 ASSESSMENT — PAIN DESCRIPTION - LOCATION
LOCATION: BACK
LOCATION: HEAD

## 2017-12-22 ASSESSMENT — PAIN DESCRIPTION - FREQUENCY: FREQUENCY: INTERMITTENT

## 2017-12-22 ASSESSMENT — PAIN DESCRIPTION - PAIN TYPE
TYPE: ACUTE PAIN
TYPE: ACUTE PAIN

## 2017-12-22 ASSESSMENT — PAIN DESCRIPTION - PROGRESSION: CLINICAL_PROGRESSION: GRADUALLY WORSENING
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
## 2017-12-22 NOTE — PLAN OF CARE
Problem: Falls - Risk of  Goal: Absence of falls  Outcome: Ongoing  No falls this shift, pt uses no assistive devices, pt can get unsteady at times but is appropriate in knowing she needs to slow down and take it easy    Problem: Risk for Impaired Skin Integrity  Goal: Tissue integrity - skin and mucous membranes  Structural intactness and normal physiological function of skin and  mucous membranes. Outcome: Met This Shift      Problem: Discharge Planning:  Goal: Discharged to appropriate level of care  Discharged to appropriate level of care   Outcome: Ongoing  Plan to discharge home with mother    Problem: Fluid Volume - Imbalance:  Goal: Will remain free of signs and symptoms of dehydration  Will remain free of signs and symptoms of dehydration   Outcome: Ongoing  Vitals:    12/21/17 1600 12/21/17 1757 12/21/17 1900 12/21/17 2310   BP: (!) 151/95 (!) 153/95 105/65 113/74   Pulse: 117 114 109 101   Resp: 18 22 16 16   Temp:  98.6 °F (37 °C) 98.8 °F (37.1 °C) 98.5 °F (36.9 °C)   TempSrc:  Oral Oral Oral   SpO2:  100% 95% 98%   Weight:       Height:         I/O last 3 completed shifts: In: 1041.6 [I.V.:1041.6]  Out: -   No intake/output data recorded. Goal: Absence of imbalanced fluid volume signs and symptoms  Absence of imbalanced fluid volume signs and symptoms   Outcome: Ongoing  Vitals:    12/21/17 1600 12/21/17 1757 12/21/17 1900 12/21/17 2310   BP: (!) 151/95 (!) 153/95 105/65 113/74   Pulse: 117 114 109 101   Resp: 18 22 16 16   Temp:  98.6 °F (37 °C) 98.8 °F (37.1 °C) 98.5 °F (36.9 °C)   TempSrc:  Oral Oral Oral   SpO2:  100% 95% 98%   Weight:       Height:                 Problem: Serum Glucose Level - Abnormal:  Goal: Ability to maintain appropriate glucose levels will improve  Ability to maintain appropriate glucose levels will improve   Outcome: Ongoing  Results for Andrzej Rivera (MRN [id number]) as of 12/22/2017 00:04   Ref.  Range 12/21/2017 06:18 12/21/2017 11:10 12/21/2017 15:42 12/21/2017

## 2017-12-22 NOTE — PLAN OF CARE
Problem: Falls - Risk of  Goal: Absence of falls  Outcome: Ongoing  Patient free from fall this shift, will continue with hourly rounding, pt using call light correctly    Problem: Risk for Impaired Skin Integrity  Goal: Tissue integrity - skin and mucous membranes  Structural intactness and normal physiological function of skin and  mucous membranes. Outcome: Ongoing  Skin is free from new breakdown this shift,  Encouraged patient to change position often. Problem: Discharge Planning:  Goal: Discharged to appropriate level of care  Discharged to appropriate level of care   Outcome: Ongoing  Plan is for patient to return home with grandma and fiance. Problem: Nutrition  Goal: Optimal nutrition therapy  Outcome: Ongoing  Encouraged patient to eat a balanced meal this shift. Problem: Pain:  Goal: Pain level will decrease  Pain level will decrease   Outcome: Ongoing  PRN Tylenol being used for headache. Comments: Care plan reviewed with patient. Patient verbalize understanding of the plan of care and contribute to goal setting.

## 2017-12-22 NOTE — OP NOTE
the antrum for pathology. Stomach was deflated. The endoscope was traced back to the distal  esophagus. I did not biopsy because of the ulcerations. The endoscope was  removed and the procedure terminated. The patient tolerated the procedure  well, taken to GI lab recovery area in stable condition. IMPRESSION:  1. Distal esophagitis with erosions and linear ulcers as noted, grade 2  esophageal ulcers. 2.  Gastritis. Pictures are pending. PLAN:  1. Change to p.o. Protonix. Add Carafate liquid. 2.  Maybe a degree of gastroparesis, but there also may just have been some  of the nausea and vomiting relating to out of control diabetes. 3.  She will need this rechecked in 7 to 8 weeks. CHERYL Laws M.D.      D: 12/21/2017 13:40:43       T: 12/21/2017 14:26:45     [email or web address]  Job#: [id number]     Doc#: [id number]    CC:   Shavon Barr CNP

## 2017-12-22 NOTE — PROGRESS NOTES
Pharmacy Medication Reconciliation and Discharge Counseling Note    Pt discharged from [facility] today after admission for DKA    Discharge instructions reviewed with the patient. Discharge Medications:         Medication List        START taking these medications      lidocaine 5 %  Commonly known as:  LIDODERM  Place 2 patches onto the skin daily 12 hours on, 12 hours off.     pantoprazole 40 MG tablet  Commonly known as:  PROTONIX  Take 1 tablet by mouth 2 times daily (before meals)     sucralfate 1 GM/10ML suspension  Commonly known as:  CARAFATE  Take 10 mLs by mouth 4 times daily (before meals and nightly)            CHANGE how you take these medications      cyclobenzaprine 5 MG tablet  Commonly known as:  FLEXERIL  Take 1 tablet by mouth 3 times daily as needed for Muscle spasms  What changed:  · how much to take  · when to take this  · reasons to take this            CONTINUE taking these medications      BLOOD GLUCOSE TEST STRIPS Strp  FreeStyle Freedom Lite Test Strips. Test blood sugars 3 times daily.      DULoxetine 30 MG extended release capsule  Commonly known as:  CYMBALTA     FREESTYLE LANCETS Misc  1 each by Does not apply route 3 times daily     insulin glargine 100 UNIT/ML injection vial  Commonly known as:  LANTUS  Inject 35 Units into the skin nightly     * insulin lispro 100 UNIT/ML injection vial  Commonly known as:  HUMALOG  Inject 0-3 Units into the skin nightly     * insulin lispro 100 UNIT/ML injection vial  Commonly known as:  HUMALOG  Inject 0-6 Units into the skin 3 times daily (with meals)     * insulin lispro 100 UNIT/ML injection vial  Commonly known as:  HUMALOG  Inject 1-10 Units into the skin 3 times daily (with meals) Take 1 unit for every 10 carbs eaten in addition to sliding scale coverage     Insulin Pen Needle 30G X 8 MM Misc  1 each by Does not apply route 2 times daily     magnesium 30 MG tablet     potassium chloride 20 MEQ extended release

## 2017-12-22 NOTE — PROGRESS NOTES
Department of Internal Medicine  Section of Endocrinology   [address]    [address] , [address] [place], [address]  Office Phone [address]  ([phone])  ([address])  [address] Tom Solorzano MD, Fellow of [employer] of Endocrinology   Progress Note    Admit Date: 12/20/2017  Reviewed the chart of the last 24 hours:   SUBJECTIVE:     Chief Complaint   Patient presents with    Hyperglycemia     DKA, type 1, not at goal [facility]) [E10.10]   Patient Active Problem List   Diagnosis Code    Weight loss, unintentional R63.4    History of gastroesophageal reflux (GERD) Z87.19    Fibromyalgia M79.7    Underweight R63.6    DKA, type 1, not at goal [facility]) E10.10     <principal problem not specified>  12/20/2017  2:08 AM  Admission weight: 75 lb (34 kg)  [id number]  [id number]  [id number]    Patient has no complaints  Medication side effects: none  Eating 100%  Panendoscopy showed gastric erosion per Dr Albino Lopez' s findings on Pan endoscopy  Review of Systems  Review of Systems - General ROS: negative   Psychological ROS: negative   Hematological and Lymphatic ROS: No history of blood clots or bleeding disorder. Respiratory ROS: no cough, shortness of breath, or wheezing   Cardiovascular ROS: no chest pain or dyspnea on exertion   Gastrointestinal ROS: no abdominal pain, change in bowel habits, or black or bloody stools   Genito-Urinary ROS: no dysuria, trouble voiding, or hematuria   Musculoskeletal ROS: negative   Neurological ROS: no TIA or stroke symptoms   Dermatological ROS: negative    Past Medical, Surgical, Family, Social and Allergy Histories:  I have reviewed the patient's medical history in detail and updated the computerized patient record. has a past medical history of Diabetes ([address] and Fibromyalgia.    has a past surgical history that includes Upper gastrointestinal endoscopy (Left, 12/21/2017). reports that she has quit smoking. Her smoking use included Cigarettes. She has never used smokeless tobacco. She reports that she does not drink alcohol or use drugs. family history includes High Cholesterol in her paternal grandmother.   No Known Allergies  Current Facility-Administered Medications   Medication Dose Route Frequency Provider Last Rate Last Dose    ondansetron (ZOFRAN) injection 4 mg  4 mg Intravenous Q6H PRN Erica Wayne CNP        lidocaine (LIDODERM) 5 % 2 patch  2 patch Transdermal Daily Kalpesh Jasso DO        cyclobenzaprine (FLEXERIL) tablet 5 mg  5 mg Oral TID PRN Kalpesh Jasso DO        acetaminophen (TYLENOL) tablet 650 mg  650 mg Oral Q4H PRN Erica Wayne CNP   650 mg at 12/21/17 2142    insulin glargine (LANTUS) injection vial 35 Units  35 Units Subcutaneous Daily Smiley Barber MD   35 Units at 12/22/17 0916    glucose (GLUTOSE) 40 % oral gel 15 g  15 g Oral PRN Smiley Barber MD        dextrose 50 % solution 12.5 g  12.5 g Intravenous PRN Smiley Barber MD        glucagon (rDNA) injection 1 mg  1 mg Intramuscular PRN Smiley Barber MD        lidocaine PF 1 % injection 5 mL  5 mL Intradermal Once Flakita Cason MD        sodium chloride flush 0.9 % injection 10 mL  10 mL Intravenous 2 times per day Flakita Cason MD   10 mL at 12/21/17 1032    sodium chloride flush 0.9 % injection 10 mL  10 mL Intravenous PRN Flakita Cason MD        dextrose 5 % and 0.9 % sodium chloride infusion   Intravenous PRN Glen Ramsay MD   100 mL at 12/21/17 1331    potassium & sodium phosphates (PHOS-NAK) 280-160-250 MG packet 250 mg  1 packet Oral TID  Sergei Banks MD   250 mg at 12/22/17 0915    sodium chloride (PF) 0.9 % injection 10 mL  10 mL Intravenous 2 times per day Shan Guillen MD   10 mL at 12/22/17 0915    sodium chloride (PF) 0.9 % injection 10 mL  10 mL Intravenous PRN Iliana Hutson MD        pantoprazole (PROTONIX) tablet 40 mg  40 mg Oral BID AC Iliana Hutson MD   40 mg at 12/22/17 0634    sucralfate (CARAFATE) 1 GM/10ML suspension 1 g  1 g Oral 4x Daily AC & HS Iliana Hutson MD   1 g at 12/22/17 0914    insulin lispro (HUMALOG) injection vial 0-6 Units  0-6 Units Subcutaneous TID WC Judie Eckert MD   1 Units at 12/22/17 5560    insulin lispro (HUMALOG) injection vial 0-3 Units  0-3 Units Subcutaneous Nightly Judie Eckert MD   2 Units at 12/21/17 2214    DULoxetine (CYMBALTA) extended release capsule 30 mg  30 mg Oral Daily Rosy Desai MD   30 mg at 12/22/17 0914    dextrose 50 % solution 12.5 g  12.5 g Intravenous PRN Rosy Desai MD   15 mL at 12/21/17 0552    potassium chloride 10 mEq/100 mL IVPB (Peripheral Line)  10 mEq Intravenous PRN Rosy Desai MD        magnesium sulfate 1 g in dextrose 5 % 100 mL IVPB  1 g Intravenous PRN Rosy Desai MD        sodium phosphate 10 mmol in dextrose 5 % 250 mL IVPB  10 mmol Intravenous PRN Rosy Desai MD        Or    sodium phosphate 15 mmol in dextrose 5 % 250 mL IVPB  15 mmol Intravenous PRN Rosy Desai MD        Or    sodium phosphate 20 mmol in dextrose 5 % 500 mL IVPB  20 mmol Intravenous PRN Rosy Desai MD        dextrose 5 % and 0.45 % sodium chloride infusion   Intravenous Continuous PRN Rosy Desai MD   Stopped at 12/21/17 1409    cefTRIAXone (ROCEPHIN) 1 g in sterile water 10 mL IV syringe  1 g Intravenous Q24H Jim Cervantes MD   1 g at 12/21/17 1356    aluminum & magnesium hydroxide-simethicone (MAALOX) 200-200-20 MG/5ML suspension 30 mL  30 mL Oral Q6H PRN Tip Joyce MD   30 mL at 12/21/17 0830     Home Meds: [unfilled]  Scheduled Meds:   lidocaine  2 patch Transdermal Daily    insulin glargine  35 Units Subcutaneous Daily    lidocaine 1 % injection  5 mL Intradermal Once    sodium chloride flush  10 mL Intravenous 2 times per day    potassium & sodium phosphates  1 packet Oral TID WC    sodium chloride (PF)  10 mL Intravenous 2 times per day    pantoprazole  40 mg Oral BID AC    sucralfate  1 g Oral 4x Daily AC & HS    insulin lispro  0-6 Units Subcutaneous TID WC    insulin lispro  0-3 Units Subcutaneous Nightly    DULoxetine  30 mg Oral Daily    cefTRIAXone (ROCEPHIN) IV  1 g Intravenous Q24H     Continuous Infusions:   dextrose 5 % and 0.9 % NaCl      dextrose 5 % and 0.45 % NaCl Stopped (12/21/17 1409)     PRN Meds:ondansetron, cyclobenzaprine, acetaminophen, glucose, dextrose, glucagon (rDNA), sodium chloride flush, dextrose 5 % and 0.9 % NaCl, sodium chloride (PF), dextrose, potassium chloride, magnesium sulfate, sodium phosphate IVPB **OR** sodium phosphate IVPB **OR** sodium phosphate IVPB, dextrose 5 % and 0.45 % NaCl, aluminum & magnesium hydroxide-simethicone    OBJECTIVE        Physical      Stable, see vital signs  Heart normal, lungs clear, abdomen soft    DATA  TSH:    Lab Results   Component Value Date    TSH 2.610 12/20/2017   No components found for: FREE T4No components found for: FREET3  RADIOLOGYREPORTS:    Lab Review    Lab Results   Component Value Date    TSH 2.610 12/20/2017    X7BPXJC 72 10/27/2017     No results found for: FREET4  No results found for: TESTOSTERONE  CBC:  Lab Results   Component Value Date    WBC 11.1 12/21/2017    RBC 2.66 12/21/2017    HGB 8.3 12/21/2017    HCT 23.5 12/21/2017    MCV 88.5 12/21/2017    MCH 31.3 12/21/2017    MCHC 35.4 12/21/2017    RDW 13.4 12/21/2017     12/21/2017    MPV 7.8 12/21/2017     CMP:    Lab Results   Component Value Date     12/22/2017    K 3.8 12/22/2017     12/22/2017    CO2 25 12/22/2017    BUN 9 12/22/2017    CREATININE 0.4 12/22/2017    LABGLOM >90 12/22/2017    GLUCOSE 185 12/22/2017    PROT 7.5 12/20/2017    LABALBU 4.5 12/20/2017    CALCIUM 8.4 12/22/2017    BILITOT 0.4 12/20/2017    ALKPHOS 170 12/20/2017    AST 14 12/20/2017    ALT 18 12/20/2017     Hepatic Function Panel:    Lab Results   Component Value Date    ALKPHOS 170 12/20/2017    ALT 18 12/20/2017    AST 14 12/20/2017    PROT 7.5 12/20/2017    BILITOT 0.4 12/20/2017    BILIDIR <0.2 12/20/2017    LABALBU 4.5 12/20/2017     Magnesium:    Lab Results   Component Value Date    MG 1.7 12/20/2017     PT/INR:    Lab Results   Component Value Date    INR 0.96 12/21/2017     HgBA1c:    Lab Results   Component Value Date    LABA1C 9.5 12/20/2017      No results for input(s): CKTOTAL, CKMB, CKMBINDEX, TROPONINI in the last 72 hours. FLP:  No results found for: TRIG, HDL, LDLCALC, LDLDIRECT, LABVLDL  DIET: DIET FULL LIQUID;   Impression:  Diabetes Mellitus type 1    ASSESSMENT AND PLAN    Referred to PCP for follow up

## 2017-12-22 NOTE — PROGRESS NOTES
Dr. Hung Evans is ok with discharge. He does not want patient to follow up in his office. Patient has had multiply appointments made and patient has not shown up for any of them.  Have patient follow with family Doctor and find a new endocrinologist. No other orders received

## 2017-12-25 LAB — BLOOD CULTURE, ROUTINE: NORMAL

## 2018-01-11 ENCOUNTER — HOSPITAL ENCOUNTER (OUTPATIENT)
Dept: MRI IMAGING | Age: 23
Discharge: HOME | End: 2018-01-11
Payer: COMMERCIAL

## 2018-01-11 DIAGNOSIS — M79.7 FIBROMYALGIA: ICD-10-CM

## 2018-01-11 PROCEDURE — 6360000004 HC RX CONTRAST MEDICATION: Performed by: INTERNAL MEDICINE

## 2018-01-11 PROCEDURE — 72158 MRI LUMBAR SPINE W/O & W/DYE: CPT

## 2018-01-11 PROCEDURE — A9579 GAD-BASE MR CONTRAST NOS,1ML: HCPCS | Performed by: INTERNAL MEDICINE

## 2018-01-11 RX ADMIN — GADOTERIDOL 8 ML: 279.3 INJECTION, SOLUTION INTRAVENOUS at 14:43

## (undated) DEVICE — DEFENDO AIR WATER SUCTION AND BIOPSY VALVE KIT FOR  OLYMPUS: Brand: DEFENDO AIR/WATER/SUCTION AND BIOPSY VALVE

## (undated) DEVICE — FORCEP RAD JAW W/NEEDLE 160CM

## (undated) DEVICE — SET LNR RED GRN W/ BASE CLEANASCOPE

## (undated) DEVICE — CONMED SCOPE SAVER BITE BLOCK, 20X27 MM: Brand: SCOPE SAVER

## (undated) DEVICE — ENDO KIT: Brand: MEDLINE INDUSTRIES, INC.